# Patient Record
Sex: MALE | Race: WHITE | NOT HISPANIC OR LATINO | Employment: OTHER | URBAN - METROPOLITAN AREA
[De-identification: names, ages, dates, MRNs, and addresses within clinical notes are randomized per-mention and may not be internally consistent; named-entity substitution may affect disease eponyms.]

---

## 2019-03-30 ENCOUNTER — APPOINTMENT (OUTPATIENT)
Dept: RADIOLOGY | Facility: MEDICAL CENTER | Age: 69
DRG: 871 | End: 2019-03-30
Payer: MEDICARE

## 2019-03-30 ENCOUNTER — APPOINTMENT (OUTPATIENT)
Dept: RADIOLOGY | Facility: MEDICAL CENTER | Age: 69
DRG: 871 | End: 2019-03-30
Attending: EMERGENCY MEDICINE
Payer: MEDICARE

## 2019-03-30 ENCOUNTER — HOSPITAL ENCOUNTER (INPATIENT)
Facility: MEDICAL CENTER | Age: 69
LOS: 3 days | DRG: 871 | End: 2019-04-02
Attending: EMERGENCY MEDICINE | Admitting: INTERNAL MEDICINE
Payer: MEDICARE

## 2019-03-30 DIAGNOSIS — Z59.00 HOMELESSNESS: ICD-10-CM

## 2019-03-30 DIAGNOSIS — J18.9 COMMUNITY ACQUIRED PNEUMONIA OF RIGHT LUNG, UNSPECIFIED PART OF LUNG: ICD-10-CM

## 2019-03-30 DIAGNOSIS — R22.2 CHEST MASS: ICD-10-CM

## 2019-03-30 DIAGNOSIS — L03.115 CELLULITIS OF RIGHT LOWER EXTREMITY: ICD-10-CM

## 2019-03-30 DIAGNOSIS — N30.01 ACUTE CYSTITIS WITH HEMATURIA: ICD-10-CM

## 2019-03-30 DIAGNOSIS — R09.02 HYPOXIA: ICD-10-CM

## 2019-03-30 DIAGNOSIS — A41.9 SEPSIS, DUE TO UNSPECIFIED ORGANISM: ICD-10-CM

## 2019-03-30 PROBLEM — C80.1 MALIGNANCY (HCC): Status: ACTIVE | Noted: 2019-03-30

## 2019-03-30 PROBLEM — I10 HYPERTENSION: Status: ACTIVE | Noted: 2019-03-30

## 2019-03-30 PROBLEM — R50.9 FEVER: Status: ACTIVE | Noted: 2019-03-30

## 2019-03-30 PROBLEM — R65.10 SIRS (SYSTEMIC INFLAMMATORY RESPONSE SYNDROME) (HCC): Status: ACTIVE | Noted: 2019-03-30

## 2019-03-30 PROBLEM — L03.90 CELLULITIS: Status: ACTIVE | Noted: 2019-03-30

## 2019-03-30 PROBLEM — R06.82 TACHYPNEA: Status: ACTIVE | Noted: 2019-03-30

## 2019-03-30 PROBLEM — D72.825 BANDEMIA: Status: ACTIVE | Noted: 2019-03-30

## 2019-03-30 PROBLEM — R00.0 TACHYCARDIA: Status: ACTIVE | Noted: 2019-03-30

## 2019-03-30 PROBLEM — R91.8 LUNG MASS: Status: ACTIVE | Noted: 2019-03-30

## 2019-03-30 LAB
ALBUMIN SERPL BCP-MCNC: 3.8 G/DL (ref 3.2–4.9)
ALBUMIN/GLOB SERPL: 1.2 G/DL
ALP SERPL-CCNC: 80 U/L (ref 30–99)
ALT SERPL-CCNC: 11 U/L (ref 2–50)
AMPHET UR QL SCN: NEGATIVE
ANION GAP SERPL CALC-SCNC: 9 MMOL/L (ref 0–11.9)
APPEARANCE UR: CLEAR
AST SERPL-CCNC: 17 U/L (ref 12–45)
BACTERIA #/AREA URNS HPF: NEGATIVE /HPF
BARBITURATES UR QL SCN: NEGATIVE
BASOPHILS # BLD AUTO: 0.3 % (ref 0–1.8)
BASOPHILS # BLD: 0.06 K/UL (ref 0–0.12)
BENZODIAZ UR QL SCN: NEGATIVE
BILIRUB SERPL-MCNC: 1.5 MG/DL (ref 0.1–1.5)
BILIRUB UR QL STRIP.AUTO: NEGATIVE
BUN SERPL-MCNC: 10 MG/DL (ref 8–22)
BZE UR QL SCN: NEGATIVE
CALCIUM SERPL-MCNC: 8.7 MG/DL (ref 8.5–10.5)
CANNABINOIDS UR QL SCN: NEGATIVE
CHLORIDE SERPL-SCNC: 106 MMOL/L (ref 96–112)
CO2 SERPL-SCNC: 23 MMOL/L (ref 20–33)
COLOR UR: YELLOW
CREAT SERPL-MCNC: 0.73 MG/DL (ref 0.5–1.4)
EOSINOPHIL # BLD AUTO: 0 K/UL (ref 0–0.51)
EOSINOPHIL NFR BLD: 0 % (ref 0–6.9)
EPI CELLS #/AREA URNS HPF: NEGATIVE /HPF
ERYTHROCYTE [DISTWIDTH] IN BLOOD BY AUTOMATED COUNT: 48.6 FL (ref 35.9–50)
FLUAV RNA SPEC QL NAA+PROBE: NEGATIVE
FLUBV RNA SPEC QL NAA+PROBE: NEGATIVE
GLOBULIN SER CALC-MCNC: 3.1 G/DL (ref 1.9–3.5)
GLUCOSE SERPL-MCNC: 95 MG/DL (ref 65–99)
GLUCOSE UR STRIP.AUTO-MCNC: NEGATIVE MG/DL
HCT VFR BLD AUTO: 46.5 % (ref 42–52)
HGB BLD-MCNC: 15.2 G/DL (ref 14–18)
HYALINE CASTS #/AREA URNS LPF: ABNORMAL /LPF
IMM GRANULOCYTES # BLD AUTO: 0.15 K/UL (ref 0–0.11)
IMM GRANULOCYTES NFR BLD AUTO: 0.7 % (ref 0–0.9)
KETONES UR STRIP.AUTO-MCNC: NEGATIVE MG/DL
LACTATE BLD-SCNC: 1 MMOL/L (ref 0.5–2)
LACTATE BLD-SCNC: 1.8 MMOL/L (ref 0.5–2)
LEUKOCYTE ESTERASE UR QL STRIP.AUTO: NEGATIVE
LYMPHOCYTES # BLD AUTO: 0.47 K/UL (ref 1–4.8)
LYMPHOCYTES NFR BLD: 2.3 % (ref 22–41)
MCH RBC QN AUTO: 28.4 PG (ref 27–33)
MCHC RBC AUTO-ENTMCNC: 32.7 G/DL (ref 33.7–35.3)
MCV RBC AUTO: 86.8 FL (ref 81.4–97.8)
METHADONE UR QL SCN: NEGATIVE
MICRO URNS: ABNORMAL
MONOCYTES # BLD AUTO: 0.74 K/UL (ref 0–0.85)
MONOCYTES NFR BLD AUTO: 3.7 % (ref 0–13.4)
NEUTROPHILS # BLD AUTO: 18.64 K/UL (ref 1.82–7.42)
NEUTROPHILS NFR BLD: 93 % (ref 44–72)
NITRITE UR QL STRIP.AUTO: NEGATIVE
NRBC # BLD AUTO: 0 K/UL
NRBC BLD-RTO: 0 /100 WBC
OPIATES UR QL SCN: NEGATIVE
OXYCODONE UR QL SCN: NEGATIVE
PCP UR QL SCN: NEGATIVE
PH UR STRIP.AUTO: 7 [PH]
PHOSPHATE SERPL-MCNC: 3 MG/DL (ref 2.5–4.5)
PLATELET # BLD AUTO: 246 K/UL (ref 164–446)
PMV BLD AUTO: 9.3 FL (ref 9–12.9)
POTASSIUM SERPL-SCNC: 3.6 MMOL/L (ref 3.6–5.5)
PROCALCITONIN SERPL-MCNC: 0.91 NG/ML
PROPOXYPH UR QL SCN: NEGATIVE
PROT SERPL-MCNC: 6.9 G/DL (ref 6–8.2)
PROT UR QL STRIP: 30 MG/DL
RBC # BLD AUTO: 5.36 M/UL (ref 4.7–6.1)
RBC # URNS HPF: ABNORMAL /HPF
RBC UR QL AUTO: NEGATIVE
SODIUM SERPL-SCNC: 138 MMOL/L (ref 135–145)
SP GR UR STRIP.AUTO: 1.02
SPERM #/AREA URNS HPF: ABNORMAL /HPF
UROBILINOGEN UR STRIP.AUTO-MCNC: 1 MG/DL
WBC # BLD AUTO: 20.1 K/UL (ref 4.8–10.8)
WBC #/AREA URNS HPF: ABNORMAL /HPF

## 2019-03-30 PROCEDURE — 87502 INFLUENZA DNA AMP PROBE: CPT

## 2019-03-30 PROCEDURE — 700117 HCHG RX CONTRAST REV CODE 255: Performed by: EMERGENCY MEDICINE

## 2019-03-30 PROCEDURE — 93970 EXTREMITY STUDY: CPT

## 2019-03-30 PROCEDURE — 81001 URINALYSIS AUTO W/SCOPE: CPT

## 2019-03-30 PROCEDURE — 90670 PCV13 VACCINE IM: CPT | Performed by: INTERNAL MEDICINE

## 2019-03-30 PROCEDURE — 85025 COMPLETE CBC W/AUTO DIFF WBC: CPT

## 2019-03-30 PROCEDURE — 84145 PROCALCITONIN (PCT): CPT

## 2019-03-30 PROCEDURE — 71045 X-RAY EXAM CHEST 1 VIEW: CPT

## 2019-03-30 PROCEDURE — 71260 CT THORAX DX C+: CPT

## 2019-03-30 PROCEDURE — 96365 THER/PROPH/DIAG IV INF INIT: CPT

## 2019-03-30 PROCEDURE — 96375 TX/PRO/DX INJ NEW DRUG ADDON: CPT

## 2019-03-30 PROCEDURE — 700105 HCHG RX REV CODE 258: Performed by: EMERGENCY MEDICINE

## 2019-03-30 PROCEDURE — 87040 BLOOD CULTURE FOR BACTERIA: CPT | Mod: 91

## 2019-03-30 PROCEDURE — 83605 ASSAY OF LACTIC ACID: CPT

## 2019-03-30 PROCEDURE — 80053 COMPREHEN METABOLIC PANEL: CPT

## 2019-03-30 PROCEDURE — 90471 IMMUNIZATION ADMIN: CPT

## 2019-03-30 PROCEDURE — 70450 CT HEAD/BRAIN W/O DYE: CPT

## 2019-03-30 PROCEDURE — A9270 NON-COVERED ITEM OR SERVICE: HCPCS | Performed by: EMERGENCY MEDICINE

## 2019-03-30 PROCEDURE — 85610 PROTHROMBIN TIME: CPT

## 2019-03-30 PROCEDURE — 87086 URINE CULTURE/COLONY COUNT: CPT

## 2019-03-30 PROCEDURE — 99291 CRITICAL CARE FIRST HOUR: CPT

## 2019-03-30 PROCEDURE — 700111 HCHG RX REV CODE 636 W/ 250 OVERRIDE (IP): Performed by: EMERGENCY MEDICINE

## 2019-03-30 PROCEDURE — 700102 HCHG RX REV CODE 250 W/ 637 OVERRIDE(OP): Performed by: EMERGENCY MEDICINE

## 2019-03-30 PROCEDURE — 3E0234Z INTRODUCTION OF SERUM, TOXOID AND VACCINE INTO MUSCLE, PERCUTANEOUS APPROACH: ICD-10-PCS | Performed by: INTERNAL MEDICINE

## 2019-03-30 PROCEDURE — 99223 1ST HOSP IP/OBS HIGH 75: CPT | Performed by: INTERNAL MEDICINE

## 2019-03-30 PROCEDURE — 84100 ASSAY OF PHOSPHORUS: CPT

## 2019-03-30 PROCEDURE — 96367 TX/PROPH/DG ADDL SEQ IV INF: CPT

## 2019-03-30 PROCEDURE — 80307 DRUG TEST PRSMV CHEM ANLYZR: CPT

## 2019-03-30 PROCEDURE — 700111 HCHG RX REV CODE 636 W/ 250 OVERRIDE (IP): Performed by: STUDENT IN AN ORGANIZED HEALTH CARE EDUCATION/TRAINING PROGRAM

## 2019-03-30 PROCEDURE — 85730 THROMBOPLASTIN TIME PARTIAL: CPT

## 2019-03-30 PROCEDURE — 700111 HCHG RX REV CODE 636 W/ 250 OVERRIDE (IP): Performed by: INTERNAL MEDICINE

## 2019-03-30 PROCEDURE — 93005 ELECTROCARDIOGRAM TRACING: CPT | Performed by: STUDENT IN AN ORGANIZED HEALTH CARE EDUCATION/TRAINING PROGRAM

## 2019-03-30 PROCEDURE — 700105 HCHG RX REV CODE 258: Performed by: STUDENT IN AN ORGANIZED HEALTH CARE EDUCATION/TRAINING PROGRAM

## 2019-03-30 PROCEDURE — 770022 HCHG ROOM/CARE - ICU (200)

## 2019-03-30 RX ORDER — ACETAMINOPHEN 325 MG/1
650 TABLET ORAL EVERY 6 HOURS PRN
Status: DISCONTINUED | OUTPATIENT
Start: 2019-03-30 | End: 2019-04-02 | Stop reason: HOSPADM

## 2019-03-30 RX ORDER — SODIUM CHLORIDE, SODIUM LACTATE, POTASSIUM CHLORIDE, CALCIUM CHLORIDE 600; 310; 30; 20 MG/100ML; MG/100ML; MG/100ML; MG/100ML
INJECTION, SOLUTION INTRAVENOUS CONTINUOUS
Status: DISCONTINUED | OUTPATIENT
Start: 2019-03-30 | End: 2019-04-01

## 2019-03-30 RX ORDER — AMOXICILLIN 250 MG
2 CAPSULE ORAL 2 TIMES DAILY
Status: DISCONTINUED | OUTPATIENT
Start: 2019-03-30 | End: 2019-04-02 | Stop reason: HOSPADM

## 2019-03-30 RX ORDER — ONDANSETRON 4 MG/1
4 TABLET, ORALLY DISINTEGRATING ORAL EVERY 4 HOURS PRN
Status: DISCONTINUED | OUTPATIENT
Start: 2019-03-30 | End: 2019-04-02 | Stop reason: HOSPADM

## 2019-03-30 RX ORDER — ONDANSETRON 2 MG/ML
4 INJECTION INTRAMUSCULAR; INTRAVENOUS ONCE
Status: COMPLETED | OUTPATIENT
Start: 2019-03-30 | End: 2019-03-30

## 2019-03-30 RX ORDER — ACETAMINOPHEN 325 MG/1
650 TABLET ORAL ONCE
Status: COMPLETED | OUTPATIENT
Start: 2019-03-30 | End: 2019-03-30

## 2019-03-30 RX ORDER — SODIUM CHLORIDE 9 MG/ML
1000 INJECTION, SOLUTION INTRAVENOUS
Status: DISCONTINUED | OUTPATIENT
Start: 2019-03-30 | End: 2019-04-02 | Stop reason: HOSPADM

## 2019-03-30 RX ORDER — BISACODYL 10 MG
10 SUPPOSITORY, RECTAL RECTAL
Status: DISCONTINUED | OUTPATIENT
Start: 2019-03-30 | End: 2019-04-02 | Stop reason: HOSPADM

## 2019-03-30 RX ORDER — ONDANSETRON 2 MG/ML
4 INJECTION INTRAMUSCULAR; INTRAVENOUS EVERY 4 HOURS PRN
Status: DISCONTINUED | OUTPATIENT
Start: 2019-03-30 | End: 2019-04-02 | Stop reason: HOSPADM

## 2019-03-30 RX ORDER — POLYETHYLENE GLYCOL 3350 17 G/17G
1 POWDER, FOR SOLUTION ORAL
Status: DISCONTINUED | OUTPATIENT
Start: 2019-03-30 | End: 2019-04-02 | Stop reason: HOSPADM

## 2019-03-30 RX ORDER — HYDRALAZINE HYDROCHLORIDE 20 MG/ML
10 INJECTION INTRAMUSCULAR; INTRAVENOUS EVERY 4 HOURS PRN
Status: DISCONTINUED | OUTPATIENT
Start: 2019-03-30 | End: 2019-04-02 | Stop reason: HOSPADM

## 2019-03-30 RX ORDER — SODIUM CHLORIDE 9 MG/ML
2500 INJECTION, SOLUTION INTRAVENOUS ONCE
Status: COMPLETED | OUTPATIENT
Start: 2019-03-30 | End: 2019-03-31

## 2019-03-30 RX ADMIN — PIPERACILLIN AND TAZOBACTAM 4.5 G: 4; .5 INJECTION, POWDER, LYOPHILIZED, FOR SOLUTION INTRAVENOUS; PARENTERAL at 19:00

## 2019-03-30 RX ADMIN — PNEUMOCOCCAL 13-VALENT CONJUGATE VACCINE 0.5 ML: 2.2; 2.2; 2.2; 2.2; 2.2; 4.4; 2.2; 2.2; 2.2; 2.2; 2.2; 2.2; 2.2 INJECTION, SUSPENSION INTRAMUSCULAR at 22:05

## 2019-03-30 RX ADMIN — VANCOMYCIN HYDROCHLORIDE 2400 MG: 100 INJECTION, POWDER, LYOPHILIZED, FOR SOLUTION INTRAVENOUS at 19:49

## 2019-03-30 RX ADMIN — ACETAMINOPHEN 650 MG: 325 TABLET, FILM COATED ORAL at 19:00

## 2019-03-30 RX ADMIN — IOHEXOL 100 ML: 350 INJECTION, SOLUTION INTRAVENOUS at 19:32

## 2019-03-30 RX ADMIN — PIPERACILLIN AND TAZOBACTAM 4.5 G: 4; .5 INJECTION, POWDER, LYOPHILIZED, FOR SOLUTION INTRAVENOUS; PARENTERAL at 22:04

## 2019-03-30 RX ADMIN — SODIUM CHLORIDE, POTASSIUM CHLORIDE, SODIUM LACTATE AND CALCIUM CHLORIDE: 600; 310; 30; 20 INJECTION, SOLUTION INTRAVENOUS at 21:45

## 2019-03-30 RX ADMIN — ONDANSETRON 4 MG: 2 INJECTION INTRAMUSCULAR; INTRAVENOUS at 19:11

## 2019-03-30 RX ADMIN — SODIUM CHLORIDE 2500 ML: 9 INJECTION, SOLUTION INTRAVENOUS at 18:40

## 2019-03-30 SDOH — ECONOMIC STABILITY - HOUSING INSECURITY: HOMELESSNESS UNSPECIFIED: Z59.00

## 2019-03-30 ASSESSMENT — COGNITIVE AND FUNCTIONAL STATUS - GENERAL
DAILY ACTIVITIY SCORE: 23
CLIMB 3 TO 5 STEPS WITH RAILING: A LITTLE
MOBILITY SCORE: 23
SUGGESTED CMS G CODE MODIFIER MOBILITY: CI
SUGGESTED CMS G CODE MODIFIER DAILY ACTIVITY: CI
EATING MEALS: A LITTLE

## 2019-03-30 ASSESSMENT — LIFESTYLE VARIABLES
EVER FELT BAD OR GUILTY ABOUT YOUR DRINKING: NO
TOTAL SCORE: 0
HAVE YOU EVER FELT YOU SHOULD CUT DOWN ON YOUR DRINKING: NO
CONSUMPTION TOTAL: POSITIVE
EVER_SMOKED: YES
EVER HAD A DRINK FIRST THING IN THE MORNING TO STEADY YOUR NERVES TO GET RID OF A HANGOVER: NO
HOW MANY TIMES IN THE PAST YEAR HAVE YOU HAD 5 OR MORE DRINKS IN A DAY: 15
AVERAGE NUMBER OF DAYS PER WEEK YOU HAVE A DRINK CONTAINING ALCOHOL: 5
ALCOHOL_USE: YES
TOTAL SCORE: 0
ON A TYPICAL DAY WHEN YOU DRINK ALCOHOL HOW MANY DRINKS DO YOU HAVE: 2
TOTAL SCORE: 0
HAVE PEOPLE ANNOYED YOU BY CRITICIZING YOUR DRINKING: NO

## 2019-03-30 ASSESSMENT — PATIENT HEALTH QUESTIONNAIRE - PHQ9
1. LITTLE INTEREST OR PLEASURE IN DOING THINGS: SEVERAL DAYS
2. FEELING DOWN, DEPRESSED, IRRITABLE, OR HOPELESS: SEVERAL DAYS
8. MOVING OR SPEAKING SO SLOWLY THAT OTHER PEOPLE COULD HAVE NOTICED. OR THE OPPOSITE, BEING SO FIGETY OR RESTLESS THAT YOU HAVE BEEN MOVING AROUND A LOT MORE THAN USUAL: SEVERAL DAYS
SUM OF ALL RESPONSES TO PHQ QUESTIONS 1-9: 8
6. FEELING BAD ABOUT YOURSELF - OR THAT YOU ARE A FAILURE OR HAVE LET YOURSELF OR YOUR FAMILY DOWN: SEVERAL DAYS
7. TROUBLE CONCENTRATING ON THINGS, SUCH AS READING THE NEWSPAPER OR WATCHING TELEVISION: SEVERAL DAYS
5. POOR APPETITE OR OVEREATING: NOT AT ALL
4. FEELING TIRED OR HAVING LITTLE ENERGY: SEVERAL DAYS
9. THOUGHTS THAT YOU WOULD BE BETTER OFF DEAD, OR OF HURTING YOURSELF: SEVERAL DAYS
SUM OF ALL RESPONSES TO PHQ9 QUESTIONS 1 AND 2: 2
3. TROUBLE FALLING OR STAYING ASLEEP OR SLEEPING TOO MUCH: SEVERAL DAYS

## 2019-03-30 ASSESSMENT — COPD QUESTIONNAIRES
IN THE PAST 12 MONTHS DO YOU DO LESS THAN YOU USED TO BECAUSE OF YOUR BREATHING PROBLEMS: AGREE
COPD SCREENING SCORE: 7
DURING THE PAST 4 WEEKS HOW MUCH DID YOU FEEL SHORT OF BREATH: SOME OF THE TIME
DURING THE PAST 4 WEEKS HOW MUCH DID YOU FEEL SHORT OF BREATH: SOME OF THE TIME
HAVE YOU SMOKED AT LEAST 100 CIGARETTES IN YOUR ENTIRE LIFE: YES
HAVE YOU SMOKED AT LEAST 100 CIGARETTES IN YOUR ENTIRE LIFE: YES
COPD SCREENING SCORE: 7
DO YOU EVER COUGH UP ANY MUCUS OR PHLEGM?: YES, A FEW DAYS A WEEK OR MONTH
DO YOU EVER COUGH UP ANY MUCUS OR PHLEGM?: YES, A FEW DAYS A WEEK OR MONTH

## 2019-03-31 PROBLEM — Z59.00 HOMELESS: Status: ACTIVE | Noted: 2019-03-31

## 2019-03-31 PROBLEM — Z78.9 ALCOHOL USE: Status: ACTIVE | Noted: 2019-03-31

## 2019-03-31 PROBLEM — Z72.0 TOBACCO ABUSE: Status: ACTIVE | Noted: 2019-03-31

## 2019-03-31 PROBLEM — E87.6 HYPOKALEMIA: Status: ACTIVE | Noted: 2019-03-31

## 2019-03-31 PROBLEM — R41.82 ALTERED MENTAL STATUS: Status: ACTIVE | Noted: 2019-03-31

## 2019-03-31 PROBLEM — E83.42 HYPOMAGNESEMIA: Status: ACTIVE | Noted: 2019-03-31

## 2019-03-31 PROBLEM — R06.82 TACHYPNEA: Status: RESOLVED | Noted: 2019-03-30 | Resolved: 2019-03-31

## 2019-03-31 LAB
ALBUMIN SERPL BCP-MCNC: 3.2 G/DL (ref 3.2–4.9)
ALBUMIN/GLOB SERPL: 1.3 G/DL
ALP SERPL-CCNC: 54 U/L (ref 30–99)
ALT SERPL-CCNC: 9 U/L (ref 2–50)
AMMONIA PLAS-SCNC: 44 UMOL/L (ref 11–45)
ANION GAP SERPL CALC-SCNC: 6 MMOL/L (ref 0–11.9)
APTT PPP: 34.5 SEC (ref 24.7–36)
AST SERPL-CCNC: 14 U/L (ref 12–45)
BASOPHILS # BLD AUTO: 0.3 % (ref 0–1.8)
BASOPHILS # BLD: 0.06 K/UL (ref 0–0.12)
BILIRUB SERPL-MCNC: 1.8 MG/DL (ref 0.1–1.5)
BUN SERPL-MCNC: 12 MG/DL (ref 8–22)
CALCIUM SERPL-MCNC: 7.8 MG/DL (ref 8.5–10.5)
CHLORIDE SERPL-SCNC: 109 MMOL/L (ref 96–112)
CO2 SERPL-SCNC: 25 MMOL/L (ref 20–33)
CREAT SERPL-MCNC: 0.83 MG/DL (ref 0.5–1.4)
EKG IMPRESSION: NORMAL
EOSINOPHIL # BLD AUTO: 0 K/UL (ref 0–0.51)
EOSINOPHIL NFR BLD: 0 % (ref 0–6.9)
ERYTHROCYTE [DISTWIDTH] IN BLOOD BY AUTOMATED COUNT: 49.1 FL (ref 35.9–50)
ETHANOL BLD-MCNC: 0 G/DL
GLOBULIN SER CALC-MCNC: 2.5 G/DL (ref 1.9–3.5)
GLUCOSE SERPL-MCNC: 100 MG/DL (ref 65–99)
GRAM STN SPEC: NORMAL
GRAM STN SPEC: NORMAL
HCT VFR BLD AUTO: 43.6 % (ref 42–52)
HGB BLD-MCNC: 14.2 G/DL (ref 14–18)
HIV 1+2 AB+HIV1 P24 AG SERPL QL IA: NON REACTIVE
IMM GRANULOCYTES # BLD AUTO: 0.13 K/UL (ref 0–0.11)
IMM GRANULOCYTES NFR BLD AUTO: 0.6 % (ref 0–0.9)
INR PPP: 1.23 (ref 0.87–1.13)
LACTATE BLD-SCNC: 1.1 MMOL/L (ref 0.5–2)
LYMPHOCYTES # BLD AUTO: 0.76 K/UL (ref 1–4.8)
LYMPHOCYTES NFR BLD: 3.7 % (ref 22–41)
MAGNESIUM SERPL-MCNC: 1.5 MG/DL (ref 1.5–2.5)
MCH RBC QN AUTO: 28.3 PG (ref 27–33)
MCHC RBC AUTO-ENTMCNC: 32.6 G/DL (ref 33.7–35.3)
MCV RBC AUTO: 86.9 FL (ref 81.4–97.8)
MONOCYTES # BLD AUTO: 0.64 K/UL (ref 0–0.85)
MONOCYTES NFR BLD AUTO: 3.1 % (ref 0–13.4)
MRSA DNA SPEC QL NAA+PROBE: NORMAL
NEUTROPHILS # BLD AUTO: 18.85 K/UL (ref 1.82–7.42)
NEUTROPHILS NFR BLD: 92.3 % (ref 44–72)
NRBC # BLD AUTO: 0 K/UL
NRBC BLD-RTO: 0 /100 WBC
PLATELET # BLD AUTO: 215 K/UL (ref 164–446)
PMV BLD AUTO: 9.3 FL (ref 9–12.9)
POTASSIUM SERPL-SCNC: 3.7 MMOL/L (ref 3.6–5.5)
PROT SERPL-MCNC: 5.7 G/DL (ref 6–8.2)
PROTHROMBIN TIME: 15.6 SEC (ref 12–14.6)
RBC # BLD AUTO: 5.02 M/UL (ref 4.7–6.1)
SIGNIFICANT IND 70042: NORMAL
SITE SITE: NORMAL
SODIUM SERPL-SCNC: 140 MMOL/L (ref 135–145)
SOURCE SOURCE: NORMAL
TROPONIN I SERPL-MCNC: 0.04 NG/ML (ref 0–0.04)
TSH SERPL DL<=0.005 MIU/L-ACNC: 0.38 UIU/ML (ref 0.38–5.33)
VIT B12 SERPL-MCNC: 251 PG/ML (ref 211–911)
WBC # BLD AUTO: 20.4 K/UL (ref 4.8–10.8)

## 2019-03-31 PROCEDURE — 3E02340 INTRODUCTION OF INFLUENZA VACCINE INTO MUSCLE, PERCUTANEOUS APPROACH: ICD-10-PCS | Performed by: INTERNAL MEDICINE

## 2019-03-31 PROCEDURE — 87077 CULTURE AEROBIC IDENTIFY: CPT | Mod: 91

## 2019-03-31 PROCEDURE — 90662 IIV NO PRSV INCREASED AG IM: CPT | Performed by: INTERNAL MEDICINE

## 2019-03-31 PROCEDURE — 700102 HCHG RX REV CODE 250 W/ 637 OVERRIDE(OP): Performed by: STUDENT IN AN ORGANIZED HEALTH CARE EDUCATION/TRAINING PROGRAM

## 2019-03-31 PROCEDURE — 87186 SC STD MICRODIL/AGAR DIL: CPT

## 2019-03-31 PROCEDURE — 87641 MR-STAPH DNA AMP PROBE: CPT

## 2019-03-31 PROCEDURE — 87205 SMEAR GRAM STAIN: CPT

## 2019-03-31 PROCEDURE — 93010 ELECTROCARDIOGRAM REPORT: CPT | Performed by: INTERNAL MEDICINE

## 2019-03-31 PROCEDURE — 82140 ASSAY OF AMMONIA: CPT

## 2019-03-31 PROCEDURE — G0475 HIV COMBINATION ASSAY: HCPCS

## 2019-03-31 PROCEDURE — 84484 ASSAY OF TROPONIN QUANT: CPT

## 2019-03-31 PROCEDURE — A9270 NON-COVERED ITEM OR SERVICE: HCPCS | Performed by: STUDENT IN AN ORGANIZED HEALTH CARE EDUCATION/TRAINING PROGRAM

## 2019-03-31 PROCEDURE — 700105 HCHG RX REV CODE 258: Performed by: STUDENT IN AN ORGANIZED HEALTH CARE EDUCATION/TRAINING PROGRAM

## 2019-03-31 PROCEDURE — 770006 HCHG ROOM/CARE - MED/SURG/GYN SEMI*

## 2019-03-31 PROCEDURE — 82607 VITAMIN B-12: CPT

## 2019-03-31 PROCEDURE — 84443 ASSAY THYROID STIM HORMONE: CPT

## 2019-03-31 PROCEDURE — 700111 HCHG RX REV CODE 636 W/ 250 OVERRIDE (IP): Performed by: STUDENT IN AN ORGANIZED HEALTH CARE EDUCATION/TRAINING PROGRAM

## 2019-03-31 PROCEDURE — 99233 SBSQ HOSP IP/OBS HIGH 50: CPT | Performed by: INTERNAL MEDICINE

## 2019-03-31 PROCEDURE — 90471 IMMUNIZATION ADMIN: CPT

## 2019-03-31 PROCEDURE — 700111 HCHG RX REV CODE 636 W/ 250 OVERRIDE (IP): Performed by: INTERNAL MEDICINE

## 2019-03-31 PROCEDURE — 83605 ASSAY OF LACTIC ACID: CPT

## 2019-03-31 PROCEDURE — 87070 CULTURE OTHR SPECIMN AEROBIC: CPT

## 2019-03-31 PROCEDURE — 83735 ASSAY OF MAGNESIUM: CPT

## 2019-03-31 PROCEDURE — 85025 COMPLETE CBC W/AUTO DIFF WBC: CPT

## 2019-03-31 PROCEDURE — 80307 DRUG TEST PRSMV CHEM ANLYZR: CPT

## 2019-03-31 PROCEDURE — 80053 COMPREHEN METABOLIC PANEL: CPT

## 2019-03-31 PROCEDURE — 92610 EVALUATE SWALLOWING FUNCTION: CPT

## 2019-03-31 PROCEDURE — 700101 HCHG RX REV CODE 250: Performed by: INTERNAL MEDICINE

## 2019-03-31 PROCEDURE — 700105 HCHG RX REV CODE 258: Performed by: INTERNAL MEDICINE

## 2019-03-31 PROCEDURE — 36415 COLL VENOUS BLD VENIPUNCTURE: CPT

## 2019-03-31 RX ORDER — NICOTINE 21 MG/24HR
14 PATCH, TRANSDERMAL 24 HOURS TRANSDERMAL
Status: DISCONTINUED | OUTPATIENT
Start: 2019-03-31 | End: 2019-04-02

## 2019-03-31 RX ORDER — SODIUM CHLORIDE 9 MG/ML
INJECTION, SOLUTION INTRAVENOUS
Status: ACTIVE
Start: 2019-03-31 | End: 2019-03-31

## 2019-03-31 RX ORDER — FOLIC ACID 1 MG/1
1 TABLET ORAL DAILY
Status: DISCONTINUED | OUTPATIENT
Start: 2019-04-01 | End: 2019-04-02 | Stop reason: HOSPADM

## 2019-03-31 RX ORDER — THIAMINE MONONITRATE (VIT B1) 100 MG
100 TABLET ORAL DAILY
Status: DISCONTINUED | OUTPATIENT
Start: 2019-04-01 | End: 2019-04-02 | Stop reason: HOSPADM

## 2019-03-31 RX ORDER — POTASSIUM CHLORIDE 7.45 MG/ML
10 INJECTION INTRAVENOUS
Status: COMPLETED | OUTPATIENT
Start: 2019-03-31 | End: 2019-03-31

## 2019-03-31 RX ORDER — MAGNESIUM SULFATE HEPTAHYDRATE 40 MG/ML
4 INJECTION, SOLUTION INTRAVENOUS ONCE
Status: COMPLETED | OUTPATIENT
Start: 2019-03-31 | End: 2019-03-31

## 2019-03-31 RX ADMIN — NICOTINE 14 MG: 14 PATCH, EXTENDED RELEASE TRANSDERMAL at 10:20

## 2019-03-31 RX ADMIN — ACETAMINOPHEN 650 MG: 325 TABLET, FILM COATED ORAL at 19:44

## 2019-03-31 RX ADMIN — MAGNESIUM SULFATE IN WATER 4 G: 40 INJECTION, SOLUTION INTRAVENOUS at 06:17

## 2019-03-31 RX ADMIN — SODIUM CHLORIDE, POTASSIUM CHLORIDE, SODIUM LACTATE AND CALCIUM CHLORIDE: 600; 310; 30; 20 INJECTION, SOLUTION INTRAVENOUS at 18:04

## 2019-03-31 RX ADMIN — AMPICILLIN SODIUM AND SULBACTAM SODIUM 3 G: 2; 1 INJECTION, POWDER, FOR SOLUTION INTRAMUSCULAR; INTRAVENOUS at 12:26

## 2019-03-31 RX ADMIN — INFLUENZA A VIRUS A/MICHIGAN/45/2015 X-275 (H1N1) ANTIGEN (FORMALDEHYDE INACTIVATED), INFLUENZA A VIRUS A/SINGAPORE/INFIMH-16-0019/2016 IVR-186 (H3N2) ANTIGEN (FORMALDEHYDE INACTIVATED), AND INFLUENZA B VIRUS B/MARYLAND/15/2016 BX-69A (A B/COLORADO/6/2017-LIKE VIRUS) ANTIGEN (FORMALDEHYDE INACTIVATED) 0.5 ML: 60; 60; 60 INJECTION, SUSPENSION INTRAMUSCULAR at 12:27

## 2019-03-31 RX ADMIN — POTASSIUM CHLORIDE 10 MEQ: 7.46 INJECTION, SOLUTION INTRAVENOUS at 07:17

## 2019-03-31 RX ADMIN — AMPICILLIN SODIUM AND SULBACTAM SODIUM 3 G: 2; 1 INJECTION, POWDER, FOR SOLUTION INTRAMUSCULAR; INTRAVENOUS at 23:58

## 2019-03-31 RX ADMIN — ENOXAPARIN SODIUM 40 MG: 100 INJECTION SUBCUTANEOUS at 05:55

## 2019-03-31 RX ADMIN — PIPERACILLIN AND TAZOBACTAM 4.5 G: 4; .5 INJECTION, POWDER, LYOPHILIZED, FOR SOLUTION INTRAVENOUS; PARENTERAL at 07:17

## 2019-03-31 RX ADMIN — VANCOMYCIN HYDROCHLORIDE 1300 MG: 100 INJECTION, POWDER, LYOPHILIZED, FOR SOLUTION INTRAVENOUS at 21:52

## 2019-03-31 RX ADMIN — CALCIUM GLUCONATE 1 G: 98 INJECTION, SOLUTION INTRAVENOUS at 05:55

## 2019-03-31 RX ADMIN — FOLIC ACID: 5 INJECTION, SOLUTION INTRAMUSCULAR; INTRAVENOUS; SUBCUTANEOUS at 19:44

## 2019-03-31 RX ADMIN — SODIUM CHLORIDE, POTASSIUM CHLORIDE, SODIUM LACTATE AND CALCIUM CHLORIDE: 600; 310; 30; 20 INJECTION, SOLUTION INTRAVENOUS at 06:37

## 2019-03-31 RX ADMIN — AMPICILLIN SODIUM AND SULBACTAM SODIUM 3 G: 2; 1 INJECTION, POWDER, FOR SOLUTION INTRAMUSCULAR; INTRAVENOUS at 18:03

## 2019-03-31 RX ADMIN — POTASSIUM CHLORIDE 10 MEQ: 7.46 INJECTION, SOLUTION INTRAVENOUS at 05:56

## 2019-03-31 RX ADMIN — VANCOMYCIN HYDROCHLORIDE 1300 MG: 100 INJECTION, POWDER, LYOPHILIZED, FOR SOLUTION INTRAVENOUS at 10:20

## 2019-03-31 ASSESSMENT — ENCOUNTER SYMPTOMS
BLURRED VISION: 0
ABDOMINAL PAIN: 0
SINUS PAIN: 0
EYES NEGATIVE: 1
ORTHOPNEA: 0
DIARRHEA: 0
HALLUCINATIONS: 0
SPUTUM PRODUCTION: 1
BACK PAIN: 0
SPUTUM PRODUCTION: 0
NAUSEA: 0
DIZZINESS: 0
CHILLS: 1
HEMOPTYSIS: 0
SHORTNESS OF BREATH: 0
PALPITATIONS: 0
MUSCULOSKELETAL NEGATIVE: 1
CARDIOVASCULAR NEGATIVE: 1
NEUROLOGICAL NEGATIVE: 1
WHEEZING: 0
HEARTBURN: 0
SORE THROAT: 0
FEVER: 1
BRUISES/BLEEDS EASILY: 0
MYALGIAS: 0
WEIGHT LOSS: 0
DEPRESSION: 0
COUGH: 1
VOMITING: 0
CHILLS: 0
GASTROINTESTINAL NEGATIVE: 1
FEVER: 0
HEADACHES: 0

## 2019-03-31 NOTE — ASSESSMENT & PLAN NOTE
Improving, possibly close to baseline  History of schizophrenia  Further chart review and patient investigation to evaluate whether he takes any medications  Limit sedatives

## 2019-03-31 NOTE — PROGRESS NOTES
"Pharmacy Kinetics 68 y.o. male on vancomycin day # 1 3/30/2019    Received vancomycin loading dose    Indication for Treatment: sepsis, unknown source (likely cellulitis/pneumonia)    Pertinent history per medical record: Admitted on 3/30/2019. Brought in by ambulance presenting with fever. Patient is believed to be homeless. Altered, no concerns for meningitis. Lung mass on CT scan. Appears to have a postobstructive pneumonia secondary to the right lung mass. 2 lower extremity wounds. Transferred to ICU.    Other antibiotics: Zosyn    Allergies: Tape     List concerns for renal function: age, recent IV contrast    Pertinent cultures to date:   3/30/18 Blood cultures x2 - in process  3/30/18 wound cultures - needs to be collected    PCT 0.91, MRSA nares ordered    Recent Labs      19   1850   WBC  20.1*   NEUTSPOLYS  93.00*     Recent Labs      19   1745   BUN  10   CREATININE  0.73   ALBUMIN  3.8     No results for input(s): VANCOTROUGH, VANCOPEAK, VANCORANDOM in the last 72 hours.  Intake/Output Summary (Last 24 hours) at 19 2323  Last data filed at 19 2100   Gross per 24 hour   Intake             1200 ml   Output                0 ml   Net             1200 ml      Blood pressure (!) 176/114, pulse (!) 117, temperature 36.6 °C (97.8 °F), temperature source Temporal, resp. rate (!) 22, height 1.829 m (6' 0.01\"), weight 89 kg (196 lb 3.4 oz), SpO2 96 %. Temp (24hrs), Av.1 °C (100.6 °F), Min:36.6 °C (97.8 °F), Max:39.6 °C (103.3 °F)      A/P   1. Vancomycin dose change: Start vancomycin 15mg/kg q12hr (1300mg)  2. Next vancomycin level: Prior to the 3rd or 4th dose (not ordered)  3. Goal trough: 16-20 mcg/ml - target lower end of goal   4. Comments: Dosing as above. Appears low risk for vancomycin accumulation. Did get IV contrast. Monitor renal. Pharmacy will follow. Narrow therapy as able.    Crow Goss, AlexiaD, BCPS    "

## 2019-03-31 NOTE — ASSESSMENT & PLAN NOTE
Presumed to be lung mass on imaging  Evaluate [facility] medical [facility] as at [facility] to further investigate past workup  May need endobronchial biopsy if has not had it performed in the past

## 2019-03-31 NOTE — ED PROVIDER NOTES
ED Provider Note    Scribed for Freedom Ho M.D. by Daniella Jacob. 3/30/2019  6:33 PM    Means of arrival: Ambulance  History obtained from: Patient  History limited by: altered mental status     CHIEF COMPLAINT  Chief Complaint   Patient presents with   • Fever     BIB [facility].  Bystanders saw pt behind [employer] in [place].  Concern for ETOH intoxication but pt's breathalizer was 0.0.  Pt just mumbling, unable to walk.  Very unkempt and dirty.  Believed to be homeless.     HPI  Nestor Strong is a 68 y.o. male brought in by ambulance presenting with fever. Per nursing note, bystanders saw the patient sitting behind the [employer] in [place]. There was concern for alcohol intoxication but patient's breathalizer was 0.0. Patient is believed to be homeless.     Further HPI unobtainable secondary to altered mental status. Patient was mumbling and unable to answer any of my questions.     REVIEW OF SYSTEMS  See HPI for further details.   Pertinent positives include: fever, emesis    Further ROS unobtainable secondary to altered mental status    PAST MEDICAL HISTORY   has a past medical history of Diabetes; Homeless; and Schizophrenia.    SOCIAL HISTORY  Social History     Social History Main Topics   • Smoking status: Current Every Day Smoker   • Alcohol use Yes      Comment: occ   • Drug use: No       SURGICAL HISTORY   has a past surgical history that includes hernia repair and mass excision general (10/1/2010).    CURRENT MEDICATIONS    Current Facility-Administered Medications:   •  vancomycin 2,400 mg in  mL IVPB, 25 mg/kg, Intravenous, Once, Freedom Ho M.D., Last Rate: 166.7 mL/hr at 03/30/19 1949, 2,400 mg at 03/30/19 1949  •  senna-docusate (PERICOLACE or SENOKOT S) 8.6-50 MG per tablet 2 Tab, 2 Tab, Oral, BID **AND** polyethylene glycol/lytes (MIRALAX) PACKET 1 Packet, 1 Packet, Oral, QDAY PRN **AND** magnesium hydroxide (MILK OF MAGNESIA) suspension 30 mL, 30 mL, Oral, QDAY  PRN **AND** bisacodyl (DULCOLAX) suppository 10 mg, 10 mg, Rectal, QDAY PRN, Tank Schmitz M.D.  •  Respiratory Care per Protocol, , Nebulization, Continuous RT, Tank Schmitz M.D.  •  NS (BOLUS) infusion 1,000 mL, 1,000 mL, Intravenous, Once PRN, Tank Schmitz M.D.  •  lactated ringers infusion, , Intravenous, Continuous, Tank Schmitz M.D.  •  [START ON 3/31/2019] enoxaparin (LOVENOX) inj 40 mg, 40 mg, Subcutaneous, DAILY, Tank Schmitz M.D.  •  hydrALAZINE (APRESOLINE) injection 10 mg, 10 mg, Intravenous, Q4HRS PRN, Tank Schmitz M.D.  •  ondansetron (ZOFRAN) syringe/vial injection 4 mg, 4 mg, Intravenous, Q4HRS PRN, Tank Schmitz M.D.  •  ondansetron (ZOFRAN ODT) dispertab 4 mg, 4 mg, Oral, Q4HRS PRN, Tank Schmitz M.D.  •  acetaminophen (TYLENOL) tablet 650 mg, 650 mg, Oral, Q6HRS PRN, Tank Schmitz M.D.  •  [DISCONTINUED] piperacillin-tazobactam (ZOSYN) 4.5 g in  mL IVPB, 4.5 g, Intravenous, Once **AND** piperacillin-tazobactam (ZOSYN) 4.5 g in  mL IVPB, 4.5 g, Intravenous, Q8HRS, Tank Schmitz M.D.  •  MD Alert...Vancomycin per Pharmacy, , Other, PHARMACY TO DOSE, Tank Schmitz M.D.  No current outpatient prescriptions on file.    ALLERGIES  Allergies   Allergen Reactions   • Tape        PHYSICAL EXAM  VITAL SIGNS: BP (!) 176/114   Pulse (!) 104   Temp (!) 39.6 °C (103.3 °F) (Oral)   Resp (!) 34      SpO2: I interpret this pulse oximetry as normal  Constitutional: Dirty, disheveled.  Drowsy but arousable  HENT: Normocephalic, Atraumatic, Bilateral external ears normal, Oropharynx moist, No oral exudates.   Eyes: PERRLA, EOMI, Conjunctiva normal, No discharge.   Neck: No tenderness, Supple, No stridor.   Lymphatic: No lymphadenopathy noted.   Cardiovascular: Tachcyardic, Normal rhythm.   Thorax & Lungs: Shallow breath sounds, but clear to auscultation bilaterally, No respiratory distress, No wheezing, No crackles.   Abdomen: Distended. Soft, No  tenderness, No masses, No pulsatile masses. Non peritoneal   Skin: Midline healed scar to abdomen. Warm, Dry, No erythema, No rash.   Extremities: Pitting edema to right lower extremity with redness and warmth that extends to the knee. Slight left sided pitting edema. No cyanosis.   Musculoskeletal: No tenderness to palpation or major deformities noted.  Intact distal pulses  Neurologic: Moves all extremities spontaneously.  No focal neuro deficit, strength and sensation appear to be intact.  No obvious cranial nerve deficit.  Psychiatric: Mumbling, unable to answer any of my questions. Confused but arousable.       DIAGNOSTIC STUDIES / PROCEDURES    EKG  As below    LABORATORY  Results for orders placed or performed during the hospital encounter of 03/30/19   Lactic acid (lactate)   Result Value Ref Range    Lactic Acid 1.8 0.5 - 2.0 mmol/L   COMP METABOLIC PANEL   Result Value Ref Range    Sodium 138 135 - 145 mmol/L    Potassium 3.6 3.6 - 5.5 mmol/L    Chloride 106 96 - 112 mmol/L    Co2 23 20 - 33 mmol/L    Anion Gap 9.0 0.0 - 11.9    Glucose 95 65 - 99 mg/dL    Bun 10 8 - 22 mg/dL    Creatinine 0.73 0.50 - 1.40 mg/dL    Calcium 8.7 8.5 - 10.5 mg/dL    AST(SGOT) 17 12 - 45 U/L    ALT(SGPT) 11 2 - 50 U/L    Alkaline Phosphatase 80 30 - 99 U/L    Total Bilirubin 1.5 0.1 - 1.5 mg/dL    Albumin 3.8 3.2 - 4.9 g/dL    Total Protein 6.9 6.0 - 8.2 g/dL    Globulin 3.1 1.9 - 3.5 g/dL    A-G Ratio 1.2 g/dL   URINALYSIS   Result Value Ref Range    Color Yellow     Character Clear     Specific Gravity 1.017 <1.035    Ph 7.0 5.0 - 8.0    Glucose Negative Negative mg/dL    Ketones Negative Negative mg/dL    Protein 30 (A) Negative mg/dL    Bilirubin Negative Negative    Urobilinogen, Urine 1.0 Negative    Nitrite Negative Negative    Leukocyte Esterase Negative Negative    Occult Blood Negative Negative    Micro Urine Req Microscopic    URINE MICROSCOPIC (W/UA)   Result Value Ref Range    WBC 10-20 (A) /hpf    RBC 2-5 (A)  /hpf    Bacteria Negative None /hpf    Epithelial Cells Negative /hpf    Hyaline Cast 0-2 /lpf    Sperm Moderate /hpf   ESTIMATED GFR   Result Value Ref Range    GFR If African American >60 >60 mL/min/1.73 m 2    GFR If Non African American >60 >60 mL/min/1.73 m 2   CBC WITH DIFFERENTIAL   Result Value Ref Range    WBC 20.1 (H) 4.8 - 10.8 K/uL    RBC 5.36 4.70 - 6.10 M/uL    Hemoglobin 15.2 14.0 - 18.0 g/dL    Hematocrit 46.5 42.0 - 52.0 %    MCV 86.8 81.4 - 97.8 fL    MCH 28.4 27.0 - 33.0 pg    MCHC 32.7 (L) 33.7 - 35.3 g/dL    RDW 48.6 35.9 - 50.0 fL    Platelet Count 246 164 - 446 K/uL    MPV 9.3 9.0 - 12.9 fL    Neutrophils-Polys 93.00 (H) 44.00 - 72.00 %    Lymphocytes 2.30 (L) 22.00 - 41.00 %    Monocytes 3.70 0.00 - 13.40 %    Eosinophils 0.00 0.00 - 6.90 %    Basophils 0.30 0.00 - 1.80 %    Immature Granulocytes 0.70 0.00 - 0.90 %    Nucleated RBC 0.00 /100 WBC    Neutrophils (Absolute) 18.64 (H) 1.82 - 7.42 K/uL    Lymphs (Absolute) 0.47 (L) 1.00 - 4.80 K/uL    Monos (Absolute) 0.74 0.00 - 0.85 K/uL    Eos (Absolute) 0.00 0.00 - 0.51 K/uL    Baso (Absolute) 0.06 0.00 - 0.12 K/uL    Immature Granulocytes (abs) 0.15 (H) 0.00 - 0.11 K/uL    NRBC (Absolute) 0.00 K/uL   URINE DRUG SCREEN   Result Value Ref Range    Amphetamines Urine Negative Negative    Barbiturates Negative Negative    Benzodiazepines Negative Negative    Cocaine Metabolite Negative Negative    Methadone Negative Negative    Opiates Negative Negative    Oxycodone Negative Negative    Phencyclidine -Pcp Negative Negative    Propoxyphene Negative Negative    Cannabinoid Metab Negative Negative   Lactic Acid -STAT Once   Result Value Ref Range    Lactic Acid 1.0 0.5 - 2.0 mmol/L     All labs reviewed by me.    RADIOLOGY  US-EXTREMITY VENOUS LOWER BILAT   Final Result      CT-CHEST,ABDOMEN,PELVIS WITH   Final Result      1.  Possible mass versus pericardial cyst noted in the right lower chest adjacent to the right heart border in the infrahilar  area measuring 3.5 cm in diameter. Lung carcinoma is in the differential diagnosis.      2.  Additional area of consolidation medially in the right middle lobe is located more anteriorly in the right hemithorax. Pneumonia is a possibility.      3.  Mild mediastinal and hilar adenopathy is noted.      4.  Mild cardiomegaly.      5.  Post cholecystectomy.      6.  Small renal cyst is noted in each kidney.      7.  No acute abnormalities noted in the abdomen or pelvis.         CT-HEAD W/O   Final Result         NO ACUTE ABNORMALITIES ARE NOTED ON CT SCAN OF THE HEAD.         DX-CHEST-PORTABLE (1 VIEW)   Final Result         1.  Wedge-shaped focal opacification possibly representing consolidation is identified along the right heart border likely in the right middle pulmonary lobe. Pneumonia is a possibility. Consolidative atelectasis is also possible. Underlying carcinoma is    in the differential diagnosis.        The radiologist's interpretations of all radiological studies have been reviewed by me.        Chest XR, 1 view (my read): Right middle lobe consolidation, no significant cardiomegaly or pulmonary edema.  Normal mediastinum. No prior films were immediately available for comparison.  Impression: Pneumonia    CHART REVIEW  Pertinent medical chart information was reviewed and reveals: no recent pertinent encounters    COURSE & MEDICAL DECISION MAKING  Pertinent Labs & Imaging studies reviewed. (See chart for details)    Differential diagnoses include but not limited to: Alcohol intoxication, alcohol withdrawal, electrolyte abnormality, sepsis, pneumonia, UTI, intra-abdominal infection, metabolic encephalopathy    6:33 PM - Patient seen and examined at bedside. The patient will be resuscitated with 1L NS IV and medicated with Tylenol 650 mg, Zofran 4 mg, Zosyn 4.5 g. Ordered for DX-chest, lactic acid, influenza A/B, estimated GFR, urine culture, urinalysis, urine microscope, CBC with differential, CMP, blood  culture, and EKG to evaluate his symptoms.     7:03 PM - Re-evaluated the patient. He informs me that he is just feeling very cold right now. Reports no allergies to medications. Informed him that I am concerned for pneumonia and plan is to admit.     7:41 PM - Reassessed the patient who denies any alcohol use and denies being in alcohol withdrawal today.     8:39 PM - Evaluated patient who is being prepped for transport to ICU. He appears stable and improved.     CRITICAL CARE  The very real possibility of a deterioration of this patient's condition required the highest level of my preparedness for sudden, emergent intervention. I provided critical care services, which included medication orders, frequent reevaluations of the patient's condition and response to treatment, ordering and reviewing test results, and discussing the case with various consultants. The critical care time associated with the care of the patient was 30 minutes. Review chart for interventions. This time is exclusive of any other billable procedures.     Vitals:    03/30/19 1830 03/30/19 1853 03/30/19 1930 03/30/19 2030   BP:       Pulse:   (!) 127 (!) 117   Resp: (!) 23  (!) 22 (!) 22   Temp:       TempSrc:       SpO2:   90% 96%   Weight:  95 kg (209 lb 7 oz)     Height:  1.829 m (6')         Medications   vancomycin 2,400 mg in  mL IVPB (2,400 mg Intravenous New Bag 3/30/19 1949)   senna-docusate (PERICOLACE or SENOKOT S) 8.6-50 MG per tablet 2 Tab (not administered)     And   polyethylene glycol/lytes (MIRALAX) PACKET 1 Packet (not administered)     And   magnesium hydroxide (MILK OF MAGNESIA) suspension 30 mL (not administered)     And   bisacodyl (DULCOLAX) suppository 10 mg (not administered)   Respiratory Care per Protocol (not administered)   NS (BOLUS) infusion 1,000 mL (not administered)   lactated ringers infusion (not administered)   enoxaparin (LOVENOX) inj 40 mg (not administered)   hydrALAZINE (APRESOLINE) injection 10 mg  (not administered)   ondansetron (ZOFRAN) syringe/vial injection 4 mg (not administered)   ondansetron (ZOFRAN ODT) dispertab 4 mg (not administered)   acetaminophen (TYLENOL) tablet 650 mg (not administered)   piperacillin-tazobactam (ZOSYN) 4.5 g in  mL IVPB (not administered)   MD Alert...Vancomycin per Pharmacy (not administered)   acetaminophen (TYLENOL) tablet 650 mg (650 mg Oral Given 3/30/19 1900)   ondansetron (ZOFRAN) syringe/vial injection 4 mg (4 mg Intravenous Given 3/30/19 1911)   piperacillin-tazobactam (ZOSYN) 4.5 g in  mL IVPB (0 g Intravenous Stopped 3/30/19 1930)   NS infusion 2,500 mL (2,500 mL Intravenous New Bag 3/30/19 1840)   iohexol (OMNIPAQUE) 350 mg/mL (100 mL Intravenous Given 3/30/19 1932)       HYDRATION: Based on the patient's presentation of Sepsis the patient was given IV fluids. IV Hydration was used because oral hydration was not as rapid as required. Upon recheck following hydration, the patient was improved.    68-year-old homeless male with history of schizophrenia presenting for altered mental status.  Found mumbling, laying down, thought to be drunk.  However breathalyzer was negative.  Was drowsy and mumbling on my evaluation, tachycardic and febrile, signs of cellulitis in the right lower extremity with coarse breath sounds throughout, distended abdomen.  No signs of acute surgical abdomen however concern for multiple sources of infection.  Extensive workup initiated, given empiric fluids and antibiotics.   Labs show leukocytosis and normal lactate.Imaging shows signs of pneumonia and also lung mass, possibly postobstructive pneumonia.  Urine also with some signs of infection.   Patient began to mentate more clearly and able to answer questions appropriately, denies any alcohol use, does not provide any history concerning for alcohol withdrawal.  Patient developed slight hypoxia during fluid bolus, responded supplemental oxygen.  Patient currently with sepsis  from multiple sources with likely lung malignancy, expect he will deteriorate with continued fluid resuscitation and antibiotic administration.  Consulted [facility], ICU, for admission.  Currently maintaining airway and no hypotension at time of admission however very tenuous and critically ill.    DISPOSITION  DISPOSITION:  Patient will be admitted to [facility] medicine, ICU, in critical condition.      FINAL IMPRESSION  Performed 30 minutes of Critical Care Time  Visit Diagnoses     ICD-10-CM   1. Acute cystitis with hematuria N30.01   2. Sepsis, due to unspecified organism (HCC) A41.9   3. Community acquired pneumonia of right lung, unspecified part of lung J18.9   4. Cellulitis of right lower extremity L03.115   5. Chest mass R22.2   6. Homelessness Z59.0   7. Hypoxia R09.02        Daniella DALLAS (Scribe), am scribing for, and in the presence of, Freedom Ho M.D..    Electronically signed by: Daniella Jacob (Scribe), 3/30/2019    Freedom DALLAS M.D. personally performed the services described in this documentation, as scribed by Daniella Jacob in my presence, and it is both accurate and complete. C    The note accurately reflects work and decisions made by me.  Freedom Ho  3/30/2019  8:56 PM

## 2019-03-31 NOTE — ASSESSMENT & PLAN NOTE
Patient appears to have a postobstructive pneumonia secondary to the right lung mass noted on CT  Procal 0.91  Blood cultures and sputum cultures pending  Discontinued vancomycin, currently only on Unasyn

## 2019-03-31 NOTE — PROGRESS NOTES
[facility] Progress Note    Admit Date: 3/30/2019  Resident(s): RIC PARKER  Date & Time:   3/31/2019   2:01 PM       HPI:  Mr Strong is a 68-year-old male who was brought in by ambulance.  Patient was found by pedestrians behind the [facility] hotel in [place].  Patient is believed to be homeless.  Patient is unkempt.  In the emergency department, patient's vital signs revealed the patient had a fever of 103.3 °F, patient was tachycardic at 104, hypertensive with a blood pressure of 176/114,  tachypneic with a respiratory rate of 34, and patient was oxygenating in the 80s and thus required supplemental oxygen.     CT scan of the head without contrast revealed no acute abnormalities.  CT scan of the chest, abdomen, and pelvis with contrast revealed a possible mass versus pericardial cyst noted in the right lower chest adjacent to the right heart border in the infrahilar area measuring 3.5 cm in diameter.  Area of consolidation medially in the right middle lobe.  Mild mediastinal and hilar adenopathy.  Mild cardiomegaly.  In the emergency department the patient was given 2.5 L of normal saline and was started on Zosyn and vancomycin.    On eval 3/31 in ICU, pt AOx4, slurring speech, baseline per pt. Hx of CVA. States he was feeling tired and SOB so he sat down on the street and stayed there till he was found. Smokes 1PPD daily, drinks EtOH daily. DNR/DNI. States he was [facility] [place] [facility]. States no work up wad done that he knows of, and nothing done in [facility] per pt.     Consultants:  Intensivist    Interval Events:  Tmax 103.3  Stach  sBP 120-130s  Nasal cannula   Vanc and unasyn (zosyn stopped 3/31)  Transfer    Review of Systems   Constitutional: Positive for chills, fever and malaise/fatigue.   HENT: Negative.  Negative for sinus pain and sore throat.    Eyes: Negative.  Negative for blurred vision.   Respiratory: Positive for cough. Negative for hemoptysis, sputum production and  "shortness of breath.    Cardiovascular: Negative.  Negative for chest pain, palpitations, orthopnea and leg swelling.   Gastrointestinal: Negative.  Negative for abdominal pain, diarrhea, heartburn, nausea and vomiting.   Genitourinary: Negative.  Negative for dysuria, frequency, hematuria and urgency.   Musculoskeletal: Negative.  Negative for myalgias.   Skin: Negative.  Negative for rash.   Neurological: Negative.  Negative for dizziness and headaches.   Endo/Heme/Allergies: Negative.         PHYSICAL EXAM  Vitals:    03/31/19 1000 03/31/19 1100 03/31/19 1200 03/31/19 1300   BP:       Pulse: (!) 110 91 84 77   Resp: (!) 21 20 19 (!) 21   Temp: 37.4 °C (99.3 °F)      TempSrc: Temporal      SpO2: 93% 96% 96% 98%   Weight:       Height:         Body mass index is 26.6 kg/m².  BP (!) 176/114   Pulse 77   Temp 37.4 °C (99.3 °F) (Temporal)   Resp (!) 21   Ht 1.829 m (6' 0.01\")   Wt 89 kg (196 lb 3.4 oz)   SpO2 98%   BMI 26.60 kg/m²   O2 therapy: Pulse Oximetry: 98 %, O2 (LPM): 4, O2 Delivery: Silicone Nasal Cannula    Physical Exam   Constitutional: He is oriented to person, place, and time. No distress.   Unkempt, disheveled    HENT:   Mouth/Throat: Mucous membranes are dry. No oropharyngeal exudate.   Abrasion on scalp   Eyes: Pupils are equal, round, and reactive to light. Conjunctivae and EOM are normal. No scleral icterus.   Neck: Neck supple. No JVD present.   Cardiovascular: Regular rhythm and normal heart sounds.  Tachycardia present.    No murmur heard.  Pulmonary/Chest: Effort normal. No respiratory distress. He has no wheezes. He has no rales.   Abdominal: Soft. Bowel sounds are normal. He exhibits no distension. There is no tenderness. There is no rebound and no guarding.   Musculoskeletal:   RLE erythema, hot, edema. LLE chronic ulcer, erythema, not hot. Multiple abrasion bilaterally. Onychomycosis    Lymphadenopathy:     He has no cervical adenopathy.   Neurological: He is alert and oriented to " person, place, and time. No cranial nerve deficit.   Slurred speech    Skin: Skin is warm and dry. He is not diaphoretic.   Multiple abrasions on extremities      Respiratory:     Respiration: (!) 21, Pulse Oximetry: 98 %    HemoDynamics:  Pulse: 77, Heart Rate (Monitored): 80 Blood Pressure : (!) 176/114, NIBP: 127/79      Fluids:    Date 03/31/19 0700 - 04/01/19 0659   Shift 1114-4195 4729-1075 5449-1820 24 Hour Total   I  N  T  A  K  E   I.V. 1870   1870      Magnesium Sulfate Volume 100   100      IV Volume (TKO) 20   20      Volume (mL) (lactated ringers infusion) 250   250      Volume (mL) (NS infusion 2,500 mL) 1500   1500    IV Piggyback 1339.7   1339.7      Volume (mL) (NS (BOLUS) infusion 1,000 mL) 0   0      Volume (mL) (piperacillin-tazobactam (ZOSYN) 4.5 g in  mL IVPB) 0   0      Volume (mL) (piperacillin-tazobactam (ZOSYN) 4.5 g in  mL IVPB) 117.9   117.9      Volume (mL) (vancomycin 1,300 mg in  mL IVPB) 208.3   208.3      Volume (mL) (calcium GLUConate 1 g in  mL IVPB) 408.3   408.3      Volume (mL) (potassium chloride (KCL) ivpb 10 mEq) 105   105      Volume (mL) (piperacillin-tazobactam (ZOSYN) 4.5 g in  mL IVPB) 0   0      Volume (mL) (vancomycin 2,400 mg in  mL IVPB) 500.1   500.1    Shift Total 3209.7   3209.7   O  U  T  P  U  T   Urine 350   350      Number of Times Voided 1 x   1 x      Urine Void (mL) 350   350    Shift Total 350   350   NET 2859.7   2859.7        Intake/Output Summary (Last 24 hours) at 03/31/19 1333  Last data filed at 03/31/19 1200   Gross per 24 hour   Intake          5740.93 ml   Output             1070 ml   Net          4670.93 ml       Weight: 89 kg (196 lb 3.4 oz)  Body mass index is 26.6 kg/m².    Recent Labs      03/30/19   1745  03/30/19 2036 03/31/19   0345   SODIUM  138   --   140   POTASSIUM  3.6   --   3.7   CHLORIDE  106   --   109   CO2  23   --   25   BUN  10   --   12   CREATININE  0.73   --   0.83   MAGNESIUM   --     --   1.5   PHOSPHORUS   --   3.0   --    CALCIUM  8.7   --   7.8*       GI/Nutrition:  Recent Labs      19   0345   ALTSGPT  11  9   ASTSGOT  17  14   ALKPHOSPHAT  80  54   TBILIRUBIN  1.5  1.8*   GLUCOSE  95  100*       Heme:  Recent Labs      19   034   RBC  5.36   --   5.02   HEMOGLOBIN  15.2   --   14.2   HEMATOCRIT  46.5   --   43.6   PLATELETCT  246   --   215   PROTHROMBTM   --   15.6*   --    APTT   --   34.5   --    INR   --   1.23*   --        Infectious Disease:  Temp  Av.6 °C (99.7 °F)  Min: 36.6 °C (97.8 °F)  Max: 39.6 °C (103.3 °F)  Recent Labs      19   WBC   --   20.1*  20.4*   NEUTSPOLYS   --   93.00*  92.30*   LYMPHOCYTES   --   2.30*  3.70*   MONOCYTES   --   3.70  3.10   EOSINOPHILS   --   0.00  0.00   BASOPHILS   --   0.30  0.30   ASTSGOT  17   --   14   ALTSGPT  11   --   9   ALKPHOSPHAT  80   --   54   TBILIRUBIN  1.5   --   1.8*       Meds:  • nicotine  14 mg     • ampicillin-sulbactam (UNASYN) IV  3 g Stopped (19 1256)   • senna-docusate  2 Tab      And   • polyethylene glycol/lytes  1 Packet      And   • magnesium hydroxide  30 mL      And   • bisacodyl  10 mg     • Respiratory Care per Protocol       • NS  1,000 mL     • LR   125 mL/hr at 19 0637   • enoxaparin  40 mg     • hydrALAZINE  10 mg     • ondansetron  4 mg     • ondansetron  4 mg     • acetaminophen  650 mg     • MD Alert...Vancomycin per Pharmacy       • vancomycin  15 mg/kg Stopped (19 1220)     Imaging:  US-EXTREMITY VENOUS LOWER BILAT   Final Result      CT-CHEST,ABDOMEN,PELVIS WITH   Final Result      1.  Possible mass versus pericardial cyst noted in the right lower chest adjacent to the right heart border in the infrahilar area measuring 3.5 cm in diameter. Lung carcinoma is in the differential diagnosis.      2.  Additional area of consolidation medially in the right middle lobe is located  more anteriorly in the right hemithorax. Pneumonia is a possibility.      3.  Mild mediastinal and hilar adenopathy is noted.      4.  Mild cardiomegaly.      5.  Post cholecystectomy.      6.  Small renal cyst is noted in each kidney.      7.  No acute abnormalities noted in the abdomen or pelvis.         CT-HEAD W/O   Final Result         NO ACUTE ABNORMALITIES ARE NOTED ON CT SCAN OF THE HEAD.         DX-CHEST-PORTABLE (1 VIEW)   Final Result         1.  Wedge-shaped focal opacification possibly representing consolidation is identified along the right heart border likely in the right middle pulmonary lobe. Pneumonia is a possibility. Consolidative atelectasis is also possible. Underlying carcinoma is    in the differential diagnosis.          Problem and Plan:    * Sepsis (HCC)- (present on admission)   Assessment & Plan    This is sepsis (without associated acute organ dysfunction).    Source cellulitis vs post-obstructive pneumonia or component of both  Procal 0.91  Influenza negative   IV fluids  Blood cultures 3/31 NGTD  Wound cultures 3/31 NGTD  Respiratory cultures pending   Started vanc and zosyn changed to Unasyn 3/31     Altered mental status- (present on admission)   Assessment & Plan    Resolved, UDS and EtOH negative   Possible due to sepsis     Lung mass- (present on admission)   Assessment & Plan    Presumed lung mass noted on CT on admission  States he was [facility] [place] [facility] but nothing was done per pt  Will need records   Pulm consult for EBUS once out of ICU     Bandemia- (present on admission)   Assessment & Plan    Please refer to cellulitis and sepsis assessment and plan     SIRS (systemic inflammatory response syndrome) (HCC)- (present on admission)   Assessment & Plan    Please refer to sepsis assessment and plan     Cellulitis- (present on admission)   Assessment & Plan    R>L, L appears to be chronic ulcer  Wound cultures, wound care  Vanc and unasyn      Malignancy  (HCC)- (present on admission)   Assessment & Plan    Please refer to lung mass assessment and plan     Pneumonia- (present on admission)   Assessment & Plan    Patient appears to have a postobstructive pneumonia secondary to the right lung mass noted on CT  Procal 0.91  Blood cultures and sputum cultures  Vanc and unasyn     Alcohol use- (present on admission)   Assessment & Plan    States daily use, EtOH 0 on admission   Monitor for withdrawl     Hypertension- (present on admission)   Assessment & Plan    PRN meds. Has hx of but takes no meds     Fever- (present on admission)   Assessment & Plan    Secondary to infection, lung vs skin  Tylenol        Homeless- (present on admission)   Assessment & Plan    Social service consult      Hypomagnesemia- (present on admission)   Assessment & Plan    Replete as needed     Hypokalemia- (present on admission)   Assessment & Plan    Replete as needed     Tobacco abuse- (present on admission)   Assessment & Plan    Nicotine patches, counseled      Tachycardia- (present on admission)   Assessment & Plan    Improving following IV fluid resuscitation          DISPO: ICU, transfer  CODE STATUS: DNR/DNI    Quality Measures:  Hines Catheter: No  DVT Prophylaxis: Enoxaparin   Ulcer Prophylaxis: no  Antibiotics: yes  Lines: PIV

## 2019-03-31 NOTE — ASSESSMENT & PLAN NOTE
This is sepsis (without associated acute organ dysfunction).    Source cellulitis vs post-obstructive pneumonia or component of both  Procal 0.91  Influenza negative   IV fluids  Blood cultures 3/31 NGTD  Wound cultures 4/1/gram-positive cocci  Respiratory cultures pending     Plan:  -Discontinued vancomycin as the foot wounds seem unlikely to be infected  -Continue Unasyn  -Continue IV fluids LR at 83 cc/h

## 2019-03-31 NOTE — PROGRESS NOTES
[facility]. All belongings and chart with patient. Receiving RN and CNA notified.

## 2019-03-31 NOTE — H&P
Internal Medicine Admitting History and Physical    Note Author: Tank Schmitz M.D.       Name Nestor Strong       1950   Age/Sex 68 y.o. male   MRN [id number]   Code Status  Full code     After 5PM or if no immediate response to page, please call for cross-coverage  Attending/Team: [facility] team See Patient List for primary contact information  Call [phone] to page      Chief Complaint:   Sepsis    HPI:    HPI was limited due to patient being in an altered mental status and mumbling, I was unable to understand patients responses.    Mr Strong is a 68-year-old male who was brought in by ambulance.  Patient was found by pedestrians behind the [facility] in [place].  Patient is believed to be homeless.  Patient is unkempt.    In the emergency department, patient's vital signs revealed the patient had a fever of 103.3 °F, patient was tachycardic at 104, hypertensive with a blood pressure of 176/114,  tachypneic with a respiratory rate of 34, and patient was oxygenating in the 80s and thus required supplemental oxygen.    CBC revealed leukocytosis with neutrophilia.  Chemistry panel was normal    Initial chest x-ray revealed a wedge-shaped focal opacification which possibly represented consolidation along the right heart border likely in the right middle pulmonary lobe.    CT scan of the head without contrast revealed no acute abnormalities.    CT scan of the chest, abdomen, and pelvis with contrast revealed a possible mass versus pericardial cyst noted in the right lower chest adjacent to the right heart border in the infrahilar area measuring 3.5 cm in diameter.  Area of consolidation medially in the right middle lobe.  Mild mediastinal and hilar adenopathy.  Mild cardiomegaly.    In the emergency department the patient was given 2.5 L of normal saline and was started on Zosyn and vancomycin.    Review of Systems   Unable to perform ROS: Acuity of condition             Past Medical History  "(Chronic medical problem, known complications and current treatment)    Past Medical History:   Diagnosis Date   • Diabetes    • Homeless    • Schizophrenia        Past Surgical History:  Past Surgical History:   Procedure Laterality Date   • MASS EXCISION GENERAL  10/1/2010    Performed by SHANNEN LENNON at SURGERY [facility] ORS   • HERNIA REPAIR         Current Outpatient Medications:  Home Medications     Reviewed by Caro Ennis R.N. (Registered Nurse) on 03/30/19 at 2122  Med List Status: <None>   Medication Last Dose Status        Patient Johnnie Taking any Medications                       Medication Allergy/Sensitivities:  Allergies   Allergen Reactions   • Tape          Family History (mandatory)   No family history on file.    Social History (mandatory)   Social History     Social History   • Marital status: Single     Spouse name: N/A   • Number of children: N/A   • Years of education: N/A     Occupational History   • Not on file.     Social History Main Topics   • Smoking status: Current Every Day Smoker   • Smokeless tobacco: Not on file   • Alcohol use Yes      Comment: occ   • Drug use: No   • Sexual activity: Not on file     Other Topics Concern   • Not on file     Social History Narrative   • No narrative on file     Living situation: Possibly homeless  PCP : Pcp Pt States None    Physical Exam     Vitals:    03/30/19 2200 03/30/19 2215 03/30/19 2230 03/30/19 2245   BP:       Pulse: (!) 118 (!) 117 (!) 117 (!) 116   Resp: (!) 21 (!) 22 (!) 25 (!) 26   Temp:       TempSrc:       SpO2: 99% 95% 95% 95%   Weight:       Height:         Body mass index is 26.6 kg/m².  BP (!) 176/114   Pulse (!) 116   Temp 36.6 °C (97.8 °F) (Temporal)   Resp (!) 26   Ht 1.829 m (6' 0.01\")   Wt 89 kg (196 lb 3.4 oz)   SpO2 95%   BMI 26.60 kg/m²   O2 therapy: Pulse Oximetry: 95 %, O2 (LPM): 4, O2 Delivery: Silicone Nasal Cannula    Physical Exam   Constitutional: He appears unhealthy. He has a sickly appearance. "   HENT:   Head: Normocephalic and atraumatic.   Mouth/Throat: Abnormal dentition.   Eyes: Pupils are equal, round, and reactive to light.   Neck: Normal range of motion. Neck supple. No thyromegaly present.   Cardiovascular: Regular rhythm and normal heart sounds.  Tachycardia present.    No murmur heard.  Pulmonary/Chest: Effort normal and breath sounds normal. No respiratory distress. He has no wheezes.   Abdominal: Soft. Bowel sounds are normal. He exhibits no distension.   scar   Lymphadenopathy:     He has no cervical adenopathy.   Neurological:   Patient was sleepy but was able to follow commands.  Difficult to understand patient's when he spoke   Skin:              Data Review       Old Records Request:   Completed  Current Records review/summary: Completed    Lab Data Review:  Recent Results (from the past 24 hour(s))   Influenza A/B By PCR (Adult - Flu Only)    Collection Time: 03/30/19 12:36 AM   Result Value Ref Range    Influenza virus A RNA Negative Negative    Influenza virus B, PCR Negative Negative   URINALYSIS    Collection Time: 03/30/19  5:30 PM   Result Value Ref Range    Color Yellow     Character Clear     Specific Gravity 1.017 <1.035    Ph 7.0 5.0 - 8.0    Glucose Negative Negative mg/dL    Ketones Negative Negative mg/dL    Protein 30 (A) Negative mg/dL    Bilirubin Negative Negative    Urobilinogen, Urine 1.0 Negative    Nitrite Negative Negative    Leukocyte Esterase Negative Negative    Occult Blood Negative Negative    Micro Urine Req Microscopic    URINE MICROSCOPIC (W/UA)    Collection Time: 03/30/19  5:30 PM   Result Value Ref Range    WBC 10-20 (A) /hpf    RBC 2-5 (A) /hpf    Bacteria Negative None /hpf    Epithelial Cells Negative /hpf    Hyaline Cast 0-2 /lpf    Sperm Moderate /hpf   URINE DRUG SCREEN    Collection Time: 03/30/19  5:38 PM   Result Value Ref Range    Amphetamines Urine Negative Negative    Barbiturates Negative Negative    Benzodiazepines Negative Negative     Cocaine Metabolite Negative Negative    Methadone Negative Negative    Opiates Negative Negative    Oxycodone Negative Negative    Phencyclidine -Pcp Negative Negative    Propoxyphene Negative Negative    Cannabinoid Metab Negative Negative   Lactic acid (lactate)    Collection Time: 03/30/19  5:45 PM   Result Value Ref Range    Lactic Acid 1.8 0.5 - 2.0 mmol/L   COMP METABOLIC PANEL    Collection Time: 03/30/19  5:45 PM   Result Value Ref Range    Sodium 138 135 - 145 mmol/L    Potassium 3.6 3.6 - 5.5 mmol/L    Chloride 106 96 - 112 mmol/L    Co2 23 20 - 33 mmol/L    Anion Gap 9.0 0.0 - 11.9    Glucose 95 65 - 99 mg/dL    Bun 10 8 - 22 mg/dL    Creatinine 0.73 0.50 - 1.40 mg/dL    Calcium 8.7 8.5 - 10.5 mg/dL    AST(SGOT) 17 12 - 45 U/L    ALT(SGPT) 11 2 - 50 U/L    Alkaline Phosphatase 80 30 - 99 U/L    Total Bilirubin 1.5 0.1 - 1.5 mg/dL    Albumin 3.8 3.2 - 4.9 g/dL    Total Protein 6.9 6.0 - 8.2 g/dL    Globulin 3.1 1.9 - 3.5 g/dL    A-G Ratio 1.2 g/dL   ESTIMATED GFR    Collection Time: 03/30/19  5:45 PM   Result Value Ref Range    GFR If African American >60 >60 mL/min/1.73 m 2    GFR If Non African American >60 >60 mL/min/1.73 m 2   CBC WITH DIFFERENTIAL    Collection Time: 03/30/19  6:50 PM   Result Value Ref Range    WBC 20.1 (H) 4.8 - 10.8 K/uL    RBC 5.36 4.70 - 6.10 M/uL    Hemoglobin 15.2 14.0 - 18.0 g/dL    Hematocrit 46.5 42.0 - 52.0 %    MCV 86.8 81.4 - 97.8 fL    MCH 28.4 27.0 - 33.0 pg    MCHC 32.7 (L) 33.7 - 35.3 g/dL    RDW 48.6 35.9 - 50.0 fL    Platelet Count 246 164 - 446 K/uL    MPV 9.3 9.0 - 12.9 fL    Neutrophils-Polys 93.00 (H) 44.00 - 72.00 %    Lymphocytes 2.30 (L) 22.00 - 41.00 %    Monocytes 3.70 0.00 - 13.40 %    Eosinophils 0.00 0.00 - 6.90 %    Basophils 0.30 0.00 - 1.80 %    Immature Granulocytes 0.70 0.00 - 0.90 %    Nucleated RBC 0.00 /100 WBC    Neutrophils (Absolute) 18.64 (H) 1.82 - 7.42 K/uL    Lymphs (Absolute) 0.47 (L) 1.00 - 4.80 K/uL    Monos (Absolute) 0.74 0.00 - 0.85  K/uL    Eos (Absolute) 0.00 0.00 - 0.51 K/uL    Baso (Absolute) 0.06 0.00 - 0.12 K/uL    Immature Granulocytes (abs) 0.15 (H) 0.00 - 0.11 K/uL    NRBC (Absolute) 0.00 K/uL   Lactic Acid -STAT Once    Collection Time: 19  8:36 PM   Result Value Ref Range    Lactic Acid 1.0 0.5 - 2.0 mmol/L   Phosphorus    Collection Time: 19  8:36 PM   Result Value Ref Range    Phosphorus 3.0 2.5 - 4.5 mg/dL   Procalcitonin    Collection Time: 19  8:36 PM   Result Value Ref Range    Procalcitonin 0.91 (H) <0.25 ng/mL   EKG    Collection Time: 19 10:08 PM   Result Value Ref Range    Report       [facility]    Test Date:  2019  Pt Name:    ANN GOODWIN                Department: [facility]  MRN:        [id number]                      Room:       [facility]  Gender:     Male                         Technician: JUVE  :        1950                   Requested By:VELIA ZAMORA  Order #:    [id number]                    Reading MD:    Measurements  Intervals                                Axis  Rate:       118                          P:          48  PA:         163                          QRS:        -49  QRSD:       87                           T:          47  QT:         339  QTc:        476    Interpretive Statements  SINUS TACHYCARDIA  LEFT ANTERIOR FASCICULAR BLOCK  ANTERIOR INFARCT, OLD  Compared to ECG 10/01/2010 16:19:51  Left anterior fascicular block now present  Myocardial infarct finding now present  Sinus rhythm no longer present         Imaging/Procedures Review:    Independant Imaging Review: Completed  US-EXTREMITY VENOUS LOWER BILAT   Final Result      CT-CHEST,ABDOMEN,PELVIS WITH   Final Result      1.  Possible mass versus pericardial cyst noted in the right lower chest adjacent to the right heart border in the infrahilar area measuring 3.5 cm in diameter. Lung carcinoma is in the differential diagnosis.      2.  Additional area of consolidation medially in the right middle lobe is  located more anteriorly in the right hemithorax. Pneumonia is a possibility.      3.  Mild mediastinal and hilar adenopathy is noted.      4.  Mild cardiomegaly.      5.  Post cholecystectomy.      6.  Small renal cyst is noted in each kidney.      7.  No acute abnormalities noted in the abdomen or pelvis.         CT-HEAD W/O   Final Result         NO ACUTE ABNORMALITIES ARE NOTED ON CT SCAN OF THE HEAD.         DX-CHEST-PORTABLE (1 VIEW)   Final Result         1.  Wedge-shaped focal opacification possibly representing consolidation is identified along the right heart border likely in the right middle pulmonary lobe. Pneumonia is a possibility. Consolidative atelectasis is also possible. Underlying carcinoma is    in the differential diagnosis.                  Assessment/Plan     * Sepsis (HCC)   Assessment & Plan    This is sepsis (without associated acute organ dysfunction).  68-year-old gentleman, who appears to be homeless, brought in by ambulance with sepsis.  Patient has SIRS 4 out of 4.  Patient's source of acute infection could be a postobstructive lung process due to mass noted on CT scan or patient's cellulitis in his lower extremity.  Patient was started on sepsis protocol, given intravenous fluid resuscitation and was treated with vancomycin and Zosyn.  Patient had blood cultures drawn and sputum cultures pending.  Patient's urine drug screen was negative.  Plan  -Admit to the ICU  - Cardiac monitoring  - Intravenous fluid resuscitation, precaution will be taken due to cardiomegaly noted on CT  -Vanco and Zosyn to continue until cultures return and were able to narrow down antibiotic choice       Lung mass   Assessment & Plan    Patient has a mass that was noted on CT scan.  The differential diagnosis for this includes malignancy.  Once patient is stable, biopsy may be considered.     Bandemia   Assessment & Plan    Please refer to cellulitis and sepsis assessment and plan     SIRS (systemic  inflammatory response syndrome) (HCC)   Assessment & Plan    Please refer to sepsis assessment and plan     Cellulitis   Assessment & Plan    Patient has 2 lower extremity wounds, one in each lower extremity.  Patient has what appears to be cellulitis of both lower extremities.  Calor and erythema were noted in the areas around these wounds.  Patient has poor hygiene and this could be exacerbating the patient's wounds.  Plan  -Patient was started on vancomycin and Zosyn  - Wound care was ordered  -Wound cultures ordered     Malignancy (HCC)   Assessment & Plan    Please refer to lung mass assessment and plan     Pneumonia   Assessment & Plan    Patient appears to have a postobstructive pneumonia secondary to the right lung mass noted on CT.  Plan  -Blood cultures and sputum cultures  - Zosyn and vancomycin pending cultures to de-escalate     Tachycardia   Assessment & Plan    Please refer to sepsis assessment and plan     Tachypnea   Assessment & Plan    Please refer to sepsis assessment and plan       Hypertension   Assessment & Plan    Patient initially presented with hypertension but blood pressure is currently within normal limits.  Plan  - Hypertensive PRN medications ordered     Fever   Assessment & Plan    Likely secondary to infectious process.  Plan  - Treat infectious process  - Tylenol ordered         Anticipated Hospital stay:  >2 midnights        Quality Measures  Quality-Core Measures  PCP: Pcp Pt States None

## 2019-03-31 NOTE — PROGRESS NOTES
· 2 RN skin check complete with Noemí LEZAMA  · Devices in place: pulse ox, BP cuff, cardiac monitor leads, PIV  · Skin assessed under devices: Yes.  · Confirmed pressure ulcers found on: BLE red hot and multiple wounds, BLE feet black but pulses +2, coccyx, buttocks, L hand 4th digit, generalized skin issues d/t lack of hygiene   · The following interventions in place: Q2H turns and repositioning. Heels and elbows floated on pillows.  Ensure patient is not laying on tubing/lines. Mepilex to sacrum.

## 2019-03-31 NOTE — CARE PLAN
Problem: Safety  Goal: Free from accidental injury    Intervention: Initiate Safety Measures  Bed in low, locked position, near nursing station, call light within reach. Bed alarm activated, appropriate fall identifiers in place.      Problem: Skin Integrity  Goal: Skin Integrity is maintained or improved    Intervention: Turn every 2 hours while on bedrest  Turning and repositioning q2h with pillows, ensure all medical devices are padded and kept away from skin.

## 2019-03-31 NOTE — PROGRESS NOTES
[facility] ICU Transfer Note                                                                                     ICU Admit Date: 3/30/19  ICU Discharge Date:   3/31/19    Primary Diagnosis:   Sepsis    ICU Course Summary (Brief Narrative):  69 y/o homeless male with a PMH of hypertension, schizophrenia was brought in by EMS after being found on the ground by pedestrians, he was also altered at the time. On eval 3/31 in ICU, pt AOx4, slurred speech, baseline per pt, states hx of CVA. States he was feeling tired and SOB so he sat down on the street and stayed there till he was found. Smokes 1PPD daily, drinks EtOH daily. DNR/DNI. States he was [facility] [place] many [facility]. States no work up was done that he knows of, and nothing done in [facility] per pt.     CT head was negative. CXR showed probable pneumonia versus mass.  CT chest showed a possible 3.5 cm mass at the right heart border as well as consolidation consistent with pneumonia.  Patient also has bilateral lower extremity swelling, erythema, warmth right more than left.  Ultrasound DVT in the ED was negative for DVT.      In the ER he was diagnosed with sepsis, 4/4 SIRS criteria.   He was started on antibiotics and admitted to the ICU.    Source of sepsis likely pulmonary versus soft tissue.  Currently on vancomycin and Unasyn.  Blood and wound cultures pending.    Need to watch for withdrawal symptoms, blood alcohol on presentation 0.  Rally bag ordered, speech eval pending after which start thiamine, folate, multivitamin oral versus IV.    He will need to be evaluated by pulmonary for the mass seen on the CT chest for EBUS.    Labs and imaging studies to be continued with their indications:  - CBC:  (Indication) Sepsis  - CMP or BMP: (Indication) hypokalemia  - Magnesium: (Indication) hypomagnesemia    Things to follow:  Cultures, follow-up with pulmonary for EBUS

## 2019-03-31 NOTE — ASSESSMENT & PLAN NOTE
Presumed, possible aspiration versus postobstructive  Change Zosyn to Unasyn given low risk for Pseudomonas  Follow-up on cultures  Trend pro-calcitonin every 48 hours

## 2019-03-31 NOTE — THERAPY
"  Speech Language Therapy Clinical Swallow Evaluation completed.  Functional Status: Patient was seen for a clinical bedside swallow evaluation this date. Per RN patient with difficulties with a dysphagia 3 diet with thin liquids after RN swallow screen. Patient with increased episodes of coughing per report. Patient was lethargic and sleepy, however able to arouse but required direct stimulation to maintain adequate DAK[place]. Patient consumed PO trials of thins, nectars, and purees. Patient with edentulous and overall weakness noted. Patient demonstrated oral holding, delayed onset of swallow, decreased laryngeal elevation and hyolaryngeal excursion noted upon palpation. Patient demonstrated delayed coughing s/p trials of thins via side of cup. Patient tolerated PO trials of NTL and consumed 4oz of puree with no clinical s/sx of aspiration, clear vocal quality, and no wet/gurgly sounds. At this time recommend a Dysphagia I diet with NTL. Patient must be awake and alert, consider upright in chair for all meals. HOLD tray if patient is sleepy. Monitor and assist for set up by staff. SLP to follow.   Recommendations - Diet: Diet / Liquid Recommendation: Dysphagia I, Nectar Thick Liquid                          Strategies: Direct supervision during meals, Monitor during meals, Assistance needed for meal tray set-up, No Straws and Head of Bed at 90 Degrees                          Medication Administration: Medication Administration : Crush all Medications in Puree  Plan of Care: Will benefit from Speech Therapy 3 times per week  Post-Acute Therapy: Discharge to a transitional care facility for continued skilled therapy services.    See \"Rehab Therapy-Acute\" Patient Summary Report for complete documentation.   "

## 2019-03-31 NOTE — CARE PLAN
Problem: Infection  Goal: Will remain free from infection    Intervention: Assess signs and symptoms of infection  Cleaned wounds. Educated on importance of hygiene

## 2019-03-31 NOTE — ASSESSMENT & PLAN NOTE
This is sepsis (without associated acute organ dysfunction).   Source = possible lungs, soft tissue    Status post sepsis protocol  Continue antibiotic (changed to Unasyn and vancomycin)  Follow-up on cultures  Monitor urine output and vital signs closely for adequacy of resuscitation

## 2019-03-31 NOTE — PROGRESS NOTES
Critical Care Progress Note    Date of admission  3/30/2019    Chief Complaint  68 y.o. male admitted 3/30/2019 with AMS    Hospital Course    Patient is a 68-year-old homeless, schizophrenic male who was brought in by EMS after he was found down by pedestrians.  He was febrile in the emergency department up to 103.3 °F and hypoxic.  Chest x-ray revealed probable pneumonia and CT scan showed possible mass.  He was given IV fluids as well as started on antibiotics in the emergency department and admitted to the intensive care unit on 3/30      Interval Problem Update  Reviewed last 24 hour events:   - sinus tach   - Tm 39.6, WBC remains elevated   - CT head ok   - neg LE DVT   - rec'd 2.5L NS, MIVFs LR @ 125   - AAOx3, slurred speech, no other deficits   - -150   - not passing bedside swallow --> SLP   - ok UOP   - low K/Mag   - vanco/zosyn day 2 --> change to unasyn/vanco (PNA + skin)   - lactic acid neg   - monitor for ETOH withdrawal, vitamins   - code status to DNR/DNI   - Patient reports that he was diagnosed with a benign lung tumor at the [facility] in the past but does not recall any additional details    Review of Systems  Review of Systems   Constitutional: Positive for malaise/fatigue. Negative for chills, fever and weight loss.   HENT: Negative for congestion and sore throat.    Eyes: Negative for blurred vision.   Respiratory: Positive for cough and sputum production. Negative for hemoptysis, shortness of breath and wheezing.    Cardiovascular: Negative for chest pain.   Gastrointestinal: Negative for abdominal pain, nausea and vomiting.   Genitourinary: Negative for dysuria.   Musculoskeletal: Negative for back pain and myalgias.   Skin: Negative for rash.   Neurological: Negative for dizziness and headaches.   Endo/Heme/Allergies: Does not bruise/bleed easily.   Psychiatric/Behavioral: Negative for depression and hallucinations.   All other systems reviewed and are negative.       Vital Signs  for last 24 hours   Temp:  [36.6 °C (97.8 °F)-39.6 °C (103.3 °F)] 37.4 °C (99.3 °F)  Pulse:  [] 103  Resp:  [6-51] 26  BP: (176)/(114) 176/114  SpO2:  [90 %-99 %] 93 %    Respiratory Information for the last 24 hours   4 lpm n/c    Physical Exam   Physical Exam   Constitutional: He is oriented to person, place, and time. He appears well-developed. No distress.   Very disheveled, unkempt, slurred speech that per patient his baseline   HENT:   Head: Normocephalic and atraumatic.   Nose: Nose normal.   Dry oral mucosa membranes, sunken eyes   Eyes: Pupils are equal, round, and reactive to light. Conjunctivae are normal. No scleral icterus.   Neck: Neck supple. No JVD present. No tracheal deviation present.   Cardiovascular: Regular rhythm and intact distal pulses.   Occasional extrasystoles are present. Tachycardia present.  PMI is not displaced.  Exam reveals no distant heart sounds and no decreased pulses.    No murmur heard.  Pulmonary/Chest: Effort normal. No tachypnea. No respiratory distress. He has no decreased breath sounds. He has no wheezes. He has rhonchi in the right lower field.   Abdominal: Soft. Bowel sounds are normal. He exhibits no distension. There is no tenderness. There is no rebound.   Musculoskeletal: He exhibits edema. He exhibits no tenderness or deformity.   Right lower extremity erythematous, hot to the touch, left lower extremity with chronic ulcers that do not appear to be infected   Neurological: He is alert and oriented to person, place, and time. No cranial nerve deficit. He exhibits normal muscle tone.   Drowsy but arouses easily, oriented x4, slurred speech, no focal deficits   Skin: Skin is warm and dry. He is not diaphoretic. No pallor.   Psychiatric: His behavior is normal. Thought content normal.   Odd affect   Nursing note and vitals reviewed.      Medications  Current Facility-Administered Medications   Medication Dose Route Frequency Provider Last Rate Last Dose   •  magnesium sulfate IVPB premix 4 g  4 g Intravenous Once Tank Schmitz M.D. 25 mL/hr at 03/31/19 0617 4 g at 03/31/19 0617   • potassium chloride (KCL) ivpb 10 mEq  10 mEq Intravenous Q HOUR Tank Schmitz M.D. 100 mL/hr at 03/31/19 0717 10 mEq at 03/31/19 0717   • nicotine (NICODERM) 14 MG/24HR 14 mg  14 mg Transdermal Daily-0600 Francis Livingston M.D.       • senna-docusate (PERICOLACE or SENOKOT S) 8.6-50 MG per tablet 2 Tab  2 Tab Oral BID Tank Schmitz M.D.   Stopped at 03/30/19 2100    And   • polyethylene glycol/lytes (MIRALAX) PACKET 1 Packet  1 Packet Oral QDAY PRN Tank Schmitz M.D.        And   • magnesium hydroxide (MILK OF MAGNESIA) suspension 30 mL  30 mL Oral QDAY PRN Tank Schmitz M.D.        And   • bisacodyl (DULCOLAX) suppository 10 mg  10 mg Rectal QDAY PRN Tank Schmitz M.D.       • Respiratory Care per Protocol   Nebulization Continuous RT Tank Schmitz M.D.       • NS (BOLUS) infusion 1,000 mL  1,000 mL Intravenous Once PRN Tank Schmitz M.D.       • lactated ringers infusion   Intravenous Continuous Tank Schmitz M.D. 125 mL/hr at 03/31/19 0637     • enoxaparin (LOVENOX) inj 40 mg  40 mg Subcutaneous DAILY Tank Schmitz M.D.   40 mg at 03/31/19 0555   • hydrALAZINE (APRESOLINE) injection 10 mg  10 mg Intravenous Q4HRS PRN Tank Schmitz M.D.       • ondansetron (ZOFRAN) syringe/vial injection 4 mg  4 mg Intravenous Q4HRS PRN Tank Schmitz M.D.       • ondansetron (ZOFRAN ODT) dispertab 4 mg  4 mg Oral Q4HRS PRN Tank Schmitz M.D.       • acetaminophen (TYLENOL) tablet 650 mg  650 mg Oral Q6HRS PRN Tank Schmitz M.D.       • piperacillin-tazobactam (ZOSYN) 4.5 g in  mL IVPB  4.5 g Intravenous Q8HRS Tank Schmitz M.D. 25 mL/hr at 03/31/19 0717 4.5 g at 03/31/19 0717   • MD Alert...Vancomycin per Pharmacy   Other PHARMACY TO DOSE Tank Schmitz M.D.       • Influenza Vaccine High-Dose pf injection 0.5 mL  0.5 mL Intramuscular Once  MADDIE Martinez M.D.       • vancomycin 1,300 mg in  mL IVPB  15 mg/kg Intravenous Q12HR Tank Schmitz M.D.           Fluids    Intake/Output Summary (Last 24 hours) at 03/31/19 0812  Last data filed at 03/31/19 0800   Gross per 24 hour   Intake          5127.19 ml   Output              720 ml   Net          4407.19 ml       Laboratory          Recent Labs      03/30/19 1745 03/30/19 2036 03/31/19 0345   SODIUM  138   --   140   POTASSIUM  3.6   --   3.7   CHLORIDE  106   --   109   CO2  23   --   25   BUN  10   --   12   CREATININE  0.73   --   0.83   MAGNESIUM   --    --   1.5   PHOSPHORUS   --   3.0   --    CALCIUM  8.7   --   7.8*     Recent Labs      03/30/19 1745 03/31/19 0345   ALTSGPT  11  9   ASTSGOT  17  14   ALKPHOSPHAT  80  54   TBILIRUBIN  1.5  1.8*   GLUCOSE  95  100*     Recent Labs      03/30/19 1745 03/30/19 1850 03/31/19 0345   WBC   --   20.1*  20.4*   NEUTSPOLYS   --   93.00*  92.30*   LYMPHOCYTES   --   2.30*  3.70*   MONOCYTES   --   3.70  3.10   EOSINOPHILS   --   0.00  0.00   BASOPHILS   --   0.30  0.30   ASTSGOT  17   --   14   ALTSGPT  11   --   9   ALKPHOSPHAT  80   --   54   TBILIRUBIN  1.5   --   1.8*     Recent Labs      03/30/19 1850 03/30/19 2036 03/31/19 0345   RBC  5.36   --   5.02   HEMOGLOBIN  15.2   --   14.2   HEMATOCRIT  46.5   --   43.6   PLATELETCT  246   --   215   PROTHROMBTM   --   15.6*   --    APTT   --   34.5   --    INR   --   1.23*   --        Imaging  X-Ray:  I have personally reviewed the images and compared with prior images. and My impression is: Right middle lobe consolidation versus mass with enlarged cardiac silhouette, no tubes or lines  CT:    Reviewed    Assessment/Plan  * Sepsis (HCC)   Assessment & Plan    This is sepsis (without associated acute organ dysfunction).   Source = possible lungs, soft tissue    Status post sepsis protocol  Continue antibiotic (changed to Unasyn and vancomycin)  Follow-up on  cultures  Monitor urine output and vital signs closely for adequacy of resuscitation     Altered mental status   Assessment & Plan    Improving, possibly close to baseline  History of schizophrenia  Further chart review and patient investigation to evaluate whether he takes any medications  Limit sedatives     Bandemia   Assessment & Plan    Monitor with therapy     SIRS (systemic inflammatory response syndrome) (HCC)   Assessment & Plan    Due to infection  Monitor     Cellulitis   Assessment & Plan    Right lower extremity  Continue vancomycin  Follow-up on cultures     Malignancy (HCC)   Assessment & Plan    Presumed to be lung mass on imaging  [facility]  May need endobronchial biopsy if has not had it performed in the past     Pneumonia   Assessment & Plan    Presumed, possible aspiration versus postobstructive  Change Zosyn to Unasyn given low risk for Pseudomonas  Follow-up on cultures  Trend pro-calcitonin every 48 hours     Alcohol use- (present on admission)   Assessment & Plan    Vitamin supplementation  Monitor for alcohol withdrawal syndrome  Alcohol cessation education     Hypertension   Assessment & Plan    PRN hydralazine for goal SBP less than 160     Fever   Assessment & Plan    Antipyretics     Hypomagnesemia- (present on admission)   Assessment & Plan    Repletion and monitor closely     Hypokalemia- (present on admission)   Assessment & Plan    Repletion and monitor closely     Tobacco abuse- (present on admission)   Assessment & Plan    Nicotine replacement patch  Tobacco cessation education     Tachycardia   Assessment & Plan    Remains tachycardic today  Continue IV fluids pending SLP eval     DNR/DNI    VTE:  Lovenox  Ulcer: Not Indicated  Lines: None    I have performed a physical exam and reviewed and updated ROS and Plan today (3/31/2019). In review of yesterday's note (3/30/2019), there are no changes except as  documented above.     Discussed patient condition and risk of morbidity and/or mortality with RN, RT, Pharmacy, [employer] resident, Charge nurse / hot rounds and Patient

## 2019-04-01 PROBLEM — L97.909 VENOUS STASIS ULCERS (HCC): Status: ACTIVE | Noted: 2019-04-01

## 2019-04-01 PROBLEM — I83.009 VENOUS STASIS ULCERS (HCC): Status: ACTIVE | Noted: 2019-04-01

## 2019-04-01 LAB
ALBUMIN SERPL BCP-MCNC: 3.1 G/DL (ref 3.2–4.9)
BUN SERPL-MCNC: 12 MG/DL (ref 8–22)
CALCIUM SERPL-MCNC: 8.2 MG/DL (ref 8.5–10.5)
CHLORIDE SERPL-SCNC: 105 MMOL/L (ref 96–112)
CO2 SERPL-SCNC: 27 MMOL/L (ref 20–33)
CREAT SERPL-MCNC: 0.73 MG/DL (ref 0.5–1.4)
GLUCOSE SERPL-MCNC: 94 MG/DL (ref 65–99)
PHOSPHATE SERPL-MCNC: 2 MG/DL (ref 2.5–4.5)
POTASSIUM SERPL-SCNC: 3.8 MMOL/L (ref 3.6–5.5)
PROCALCITONIN SERPL-MCNC: 1.66 NG/ML
SODIUM SERPL-SCNC: 136 MMOL/L (ref 135–145)
VANCOMYCIN TROUGH SERPL-MCNC: 16.3 UG/ML (ref 10–20)

## 2019-04-01 PROCEDURE — 700102 HCHG RX REV CODE 250 W/ 637 OVERRIDE(OP): Performed by: STUDENT IN AN ORGANIZED HEALTH CARE EDUCATION/TRAINING PROGRAM

## 2019-04-01 PROCEDURE — 700111 HCHG RX REV CODE 636 W/ 250 OVERRIDE (IP): Performed by: STUDENT IN AN ORGANIZED HEALTH CARE EDUCATION/TRAINING PROGRAM

## 2019-04-01 PROCEDURE — 80202 ASSAY OF VANCOMYCIN: CPT

## 2019-04-01 PROCEDURE — 700101 HCHG RX REV CODE 250: Performed by: STUDENT IN AN ORGANIZED HEALTH CARE EDUCATION/TRAINING PROGRAM

## 2019-04-01 PROCEDURE — 36415 COLL VENOUS BLD VENIPUNCTURE: CPT

## 2019-04-01 PROCEDURE — 700105 HCHG RX REV CODE 258: Performed by: INTERNAL MEDICINE

## 2019-04-01 PROCEDURE — 700105 HCHG RX REV CODE 258: Performed by: STUDENT IN AN ORGANIZED HEALTH CARE EDUCATION/TRAINING PROGRAM

## 2019-04-01 PROCEDURE — 770006 HCHG ROOM/CARE - MED/SURG/GYN SEMI*

## 2019-04-01 PROCEDURE — 700102 HCHG RX REV CODE 250 W/ 637 OVERRIDE(OP): Performed by: INTERNAL MEDICINE

## 2019-04-01 PROCEDURE — A9270 NON-COVERED ITEM OR SERVICE: HCPCS | Performed by: STUDENT IN AN ORGANIZED HEALTH CARE EDUCATION/TRAINING PROGRAM

## 2019-04-01 PROCEDURE — 99232 SBSQ HOSP IP/OBS MODERATE 35: CPT | Performed by: INTERNAL MEDICINE

## 2019-04-01 PROCEDURE — 80069 RENAL FUNCTION PANEL: CPT

## 2019-04-01 PROCEDURE — 99232 SBSQ HOSP IP/OBS MODERATE 35: CPT | Mod: GC | Performed by: INTERNAL MEDICINE

## 2019-04-01 PROCEDURE — A9270 NON-COVERED ITEM OR SERVICE: HCPCS | Performed by: INTERNAL MEDICINE

## 2019-04-01 PROCEDURE — 700111 HCHG RX REV CODE 636 W/ 250 OVERRIDE (IP): Performed by: INTERNAL MEDICINE

## 2019-04-01 PROCEDURE — 84145 PROCALCITONIN (PCT): CPT

## 2019-04-01 RX ORDER — PETROLATUM 42 G/100G
OINTMENT TOPICAL PRN
Status: DISCONTINUED | OUTPATIENT
Start: 2019-04-01 | End: 2019-04-02 | Stop reason: HOSPADM

## 2019-04-01 RX ADMIN — NICOTINE 14 MG: 14 PATCH, EXTENDED RELEASE TRANSDERMAL at 04:57

## 2019-04-01 RX ADMIN — AMPICILLIN SODIUM AND SULBACTAM SODIUM 3 G: 2; 1 INJECTION, POWDER, FOR SOLUTION INTRAMUSCULAR; INTRAVENOUS at 17:29

## 2019-04-01 RX ADMIN — Medication 100 MG: at 17:29

## 2019-04-01 RX ADMIN — FOLIC ACID 1 MG: 1 TABLET ORAL at 17:29

## 2019-04-01 RX ADMIN — HYDRALAZINE HYDROCHLORIDE 10 MG: 20 INJECTION INTRAMUSCULAR; INTRAVENOUS at 21:53

## 2019-04-01 RX ADMIN — AMPICILLIN SODIUM AND SULBACTAM SODIUM 3 G: 2; 1 INJECTION, POWDER, FOR SOLUTION INTRAMUSCULAR; INTRAVENOUS at 12:10

## 2019-04-01 RX ADMIN — ENOXAPARIN SODIUM 40 MG: 100 INJECTION SUBCUTANEOUS at 04:59

## 2019-04-01 RX ADMIN — AMPICILLIN SODIUM AND SULBACTAM SODIUM 3 G: 2; 1 INJECTION, POWDER, FOR SOLUTION INTRAMUSCULAR; INTRAVENOUS at 04:58

## 2019-04-01 RX ADMIN — POTASSIUM PHOSPHATE, MONOBASIC AND POTASSIUM PHOSPHATE, DIBASIC 30 MMOL: 224; 236 INJECTION, SOLUTION INTRAVENOUS at 09:47

## 2019-04-01 RX ADMIN — HYDRALAZINE HYDROCHLORIDE 10 MG: 20 INJECTION INTRAMUSCULAR; INTRAVENOUS at 20:17

## 2019-04-01 RX ADMIN — AMPICILLIN SODIUM AND SULBACTAM SODIUM 3 G: 2; 1 INJECTION, POWDER, FOR SOLUTION INTRAMUSCULAR; INTRAVENOUS at 23:44

## 2019-04-01 RX ADMIN — THERA TABS 1 TABLET: TAB at 17:29

## 2019-04-01 ASSESSMENT — ENCOUNTER SYMPTOMS
MYALGIAS: 0
EYE PAIN: 0
BLURRED VISION: 0
HEMOPTYSIS: 0
DIZZINESS: 0
WEAKNESS: 1
DIAPHORESIS: 1
ABDOMINAL PAIN: 0
BRUISES/BLEEDS EASILY: 0
CHILLS: 0
NERVOUS/ANXIOUS: 1
SORE THROAT: 0
DOUBLE VISION: 0
NAUSEA: 0
HEADACHES: 0
BACK PAIN: 1
EYE DISCHARGE: 0
WHEEZING: 0
SHORTNESS OF BREATH: 0
DIARRHEA: 1
FEVER: 1
VOMITING: 0
HALLUCINATIONS: 0
BACK PAIN: 0
WEAKNESS: 0
COUGH: 1
FALLS: 1
DEPRESSION: 0
PALPITATIONS: 0
ORTHOPNEA: 0
FEVER: 0
CONSTIPATION: 0
SINUS PAIN: 0
WEIGHT LOSS: 0
CHILLS: 1
HEADACHES: 1
MEMORY LOSS: 1
SPUTUM PRODUCTION: 1
DEPRESSION: 1

## 2019-04-01 ASSESSMENT — LIFESTYLE VARIABLES: SUBSTANCE_ABUSE: 1

## 2019-04-01 NOTE — CARE PLAN
Problem: Communication  Goal: The ability to communicate needs accurately and effectively will improve  Outcome: PROGRESSING AS EXPECTED  A&O x4, calm and cooperative with care. Expressive aphasia noted, explained all care as given, verbalized understanding.     Problem: Safety  Goal: Will remain free from injury  Outcome: PROGRESSING AS EXPECTED  Resting in bed at this time, call bell in reach, able to make needs known. Bed alarm in place, bed in lowest position.

## 2019-04-01 NOTE — PROGRESS NOTES
2 Rn skin check performed with TEJA Stevens:  -Devices in place: nasal cannula  -Skin assessed under devices: intact  -Confirmed pressure ulcers found on: BLE red/swollen/dry/cracked, calloused area noted on BLE, tracy heels calloused, sacrum red and blanching with raised tissue noted, bilateral elbows red and blanching, generalized dryness, blanching redness noted on right upper thigh  -The following interventions are in place: Patient turns self, BLE floated on pillows, wound care consulted

## 2019-04-01 NOTE — PROGRESS NOTES
"Pharmacy Kinetics 68 y.o. male on vancomycin day # 2 3/31/2019    Currently on Vancomycin 1300 mg iv q12hr - stared 12 hrs post loading dose    Indication for Treatment: Empiric - R/O sepsis (cellulitis vs. PNA)    Pertinent history per medical record: Admitted on 3/30/2019 for Brought in by ambulance presenting with fever. Patient is believed to be homeless. Altered, no concerns for meningitis. Lung mass on CT scan. Appears to have a postobstructive pneumonia secondary to the right lung mass. 2 lower extremity wounds. Broad spectrum antibiotics initiated.  Transferred to ICU.    Other antibiotics: Zosyn - Unasyn 3 g IV q 6 hr (3/31/19)    Allergies: Tape     List concerns for renal function:  SIRS criteria on admission, contrast 3/30/19, age 67 yo      Pertinent cultures to date:   3/30/19: PBC x 2 - in process  3/30/19: Urine - in process  3/31/19: Wound cx x 2 in process  3/31/19:  MRSA by PCR - in process  3/30/19:  Respiratory culture ordered - not collected  3/30/19:  Influenza negative    MRSA nares swab if pneumonia is a concern: Yes    Recent Labs      19   1850  19   0345   WBC  20.1*  20.4*   NEUTSPOLYS  93.00*  92.30*     Recent Labs      19   1745  19   0345   BUN  10  12   CREATININE  0.73  0.83   ALBUMIN  3.8  3.2     No results for input(s): VANCOTROUGH, VANCOPEAK, VANCORANDOM in the last 72 hours.  Intake/Output Summary (Last 24 hours) at 19 1722  Last data filed at 19 1200   Gross per 24 hour   Intake          5740.93 ml   Output             1070 ml   Net          4670.93 ml      Blood pressure 121/73, pulse 92, temperature 36.9 °C (98.5 °F), temperature source Temporal, resp. rate 20, height 1.829 m (6' 0.01\"), weight 89 kg (196 lb 3.4 oz), SpO2 96 %. Temp (24hrs), Av.5 °C (99.5 °F), Min:36.6 °C (97.8 °F), Max:39.6 °C (103.3 °F)      A/P Indication:  Empiric - R/O sepsis (cellulitis vs. PNA)     Goal vancomycin trough:  16-20 mcg/mL    1. Vancomycin dose " change: not currently indicated  2. Next vancomycin level: 4/1/19 @ 0930 (prior to 4th total dose)  3. Comments: Continued improvement throughout night.  Evaluated BLE this AM.  R leg with diffuse cellulitis, warm to touch - very strepish in appearance.  Skin breakdown with no overt s/s pus observed.  Zosyn adjusted to Unasyn today.  Wound cx collected and in process - unsure if area cultures taken from are infected as no weeping wounds appreciated.  MRSA nares in process.  Lung mass to be worked up.  PCT = 0.91 on admission, d/w PMA will repeat 48 hrs x 2 - next tomorrow AM.  Transfer out of unit planned today.  Renal function remains stable with adequate UOP documented.  Renal labs daily x 2 ordered.  Will check trough tomorrow AM prior to 4th total dose.  If patient continues to improve would consider d/c of vancomycin therapy and monitoring with no MRSA coverage.  Pharmacy to monitor and adjust as needed.            Monserrat Lewis, PharmD, BCPS

## 2019-04-01 NOTE — WOUND TEAM
Patient assessed with RN.  BLE with 2 or more  Scattered areas of crust.  Most will flake off with moisture.  RN/CNA going to attempt to cleanse/soak feet.  Aquaphor ordered for BLE.  Right buttocks with small pink area that appears to be from moisture ~01x2x<0.5.  Appears chronic.  All other areas intact.  No other wound care needs at this time.

## 2019-04-01 NOTE — PROGRESS NOTES
Assumed care of pt from Timo Weldon. Pt has complaints of 5/10 mid back pain.   Hourly rounding in place. Call light within reach, treaded slipper socks on, bed locked in lowest position. Mobility and fall risk assessed- proper communication signs are in place. Built in bed alarm is on, will have CNA add strip alarm as well.

## 2019-04-01 NOTE — PROGRESS NOTES
A&O x4, calm and cooperative with care. Able to make needs known. Expressive aphasia noted. Explained all care as given. ble red and swollen. Pt stood at edge of bed with myself, standby assist tolerated well. Slightly unsteady. Resting in bed, bed alarm in place, call bell in reach.

## 2019-04-01 NOTE — PROGRESS NOTES
· 2 RN skin check complete with Funmi  · Devices in place: 2 PIVs  · Skin assessed under devices:N/A  · Confirmed pressure ulcers found on: BLE red hot and multiple wounds, BLE pulses +2, coccyx, buttocks, (pictures submitted, wound team consulted) L hand 4th digit, generalized skin issues d/t lack of hygiene   · The following interventions in place: LORNA mattress

## 2019-04-01 NOTE — CARE PLAN
Problem: Fluid Volume:  Goal: Will maintain balanced intake and output  Outcome: PROGRESSING AS EXPECTED  Pt is currently receiving an LR infusion at 83 mL/hr and a rally bag infusion at 42 mL/hr.    Problem: Respiratory:  Goal: Respiratory status will improve  Outcome: PROGRESSING SLOWER THAN EXPECTED  Pt is currently on 4L O2; pt's baseline is RA. Pt has a productive cough, this Rn has been unable to visualize sputum.

## 2019-04-01 NOTE — NON-PROVIDER
Internal Medicine Medical Student Admitting History and Physical  Note Author: Gunner Roque, Student    Name Nestor Strong       1950   Age/Sex 68 y.o. male   MRN [id number]   Code Status DNR/DNI       Chief Complaint:  Altered Mental Status vs Sepsis    HPI:  Mr. Strong is a 68-year-old homeless male with history of schizophrenia presenting for altered mental status.  Found mumbling, laying down, thought to be drunk.  However breathalyzer was negative. In the emergency department, patient was found to be febrile with a temp of 103.3 °F, tachycardic at 104, hypertensive /114,  tachypneic RR 34, and oxygenating in the 80s. He was drowsy and mumbling with coarse breath sounds throughout and a distended abdomen. An extensive workup was initiated, he was given empiric fluids and antibiotics (Vanc Zosyn). Labs show leukocytosis (WBC 20.1) and normal lactate. Imaging showed signs of pneumonia with an associated lung mass, possibly postobstructive pneumonia. UA and UDS were negative. Patient began to mentate more clearly and able to answer questions appropriately, denied any alcohol use, does not provide any history concerning for alcohol withdrawal. Patient developed slight hypoxia during fluid bolus, responded supplemental oxygen.  Patient currently with sepsis from possible multiple sources and possible lung malignancy, expect he will deteriorate with continued fluid resuscitation and antibiotic administration.    On eval 3/31 in ICU, pt AOx4, slurring speech, baseline per pt. Hx of CVA. States he was feeling tired and SOB so he sat down on the street and stayed there till he was found. Smokes 1PPD daily, drinks EtOH daily. DNR/DNI. States he [facility] [place] [facility]. States no work up wad done that he knows of, and nothing done in [facility] per pt.    24hrs:  - sinus tach; improved HR 90s  - WBC remains elevated (20.4)  - not passing bedside swallow; NTL per speech   -  low K/Mag  - vanco/zosyn day 2 changed to unasyn/vanco (PNA + skin)  - lactic acid neg  - Pt states he is feeling better, but seems confused on why he is in the hospital      Review of Systems   Constitutional: Positive for chills, diaphoresis, fever and malaise/fatigue.   HENT: Positive for congestion. Negative for ear pain.    Eyes: Negative for blurred vision.   Respiratory: Positive for cough and sputum production.    Cardiovascular: Negative for chest pain, palpitations and orthopnea.   Gastrointestinal: Positive for diarrhea. Negative for abdominal pain, constipation, nausea and vomiting.   Genitourinary: Negative for dysuria, frequency and urgency.   Musculoskeletal: Positive for back pain, falls and joint pain.   Skin: Positive for rash.   Neurological: Positive for weakness and headaches. Negative for dizziness.   Psychiatric/Behavioral: Positive for depression, memory loss and substance abuse. The patient is nervous/anxious.            Past Medical History (chronic problems, known complications, current management)     Past Medical History:   Diagnosis Date   • Diabetes    • Homeless    • Schizophrenia        Past Surgical History:  Past Surgical History:   Procedure Laterality Date   • MASS EXCISION GENERAL  10/1/2010    Performed by SHANNEN LENNON at SURGERY [facility] ORS   • HERNIA REPAIR         Medication Allergy/Sensitivities:  No current facility-administered medications on file prior to encounter.      No current outpatient prescriptions on file prior to encounter.       Family History:  No family history on file.    Social History:  Social History     Social History   • Marital status: Single     Spouse name: N/A   • Number of children: N/A   • Years of education: N/A     Occupational History   • Not on file.     Social History Main Topics   • Smoking status: Current Every Day Smoker   • Smokeless tobacco: Not on file   • Alcohol use Yes      Comment: occ   • Drug use: No   • Sexual activity:  "Not on file     Other Topics Concern   • Not on file     Social History Narrative   • No narrative on file   Homeless      Physical Exam     Vitals:    03/31/19 1500 03/31/19 1626 03/31/19 1910 04/01/19 0329   BP:  121/73 136/85 145/85   Pulse: 94 92 93 95   Resp: (!) 41 20 16 20   Temp:  36.9 °C (98.5 °F) 36.8 °C (98.3 °F) 36.8 °C (98.3 °F)   TempSrc:  Temporal Temporal Temporal   SpO2: 92% 96% 90% 94%   Weight:       Height:         Body mass index is 26.6 kg/m².  /85   Pulse 95   Temp 36.8 °C (98.3 °F) (Temporal)   Resp 20   Ht 1.829 m (6' 0.01\")   Wt 89 kg (196 lb 3.4 oz)   SpO2 94%   BMI 26.60 kg/m²   O2 therapy: Pulse Oximetry: 94 %, O2 (LPM): 4, O2 Delivery: Silicone Nasal Cannula    Physical Exam   Constitutional: He appears malnourished. He appears unhealthy. He has a sickly appearance. He appears distressed.   Disheveled with slurred speech   HENT:   Pt is unkempt with dry mucous membranes and a cachectic appearance    Eyes:   The patient has sunken eyes with erythematous plapebral conjunctivae and clear sclera   Pupils are constricted and react slowly to light   Cardiovascular: Regular rhythm, S1 normal, S2 normal and intact distal pulses.  Tachycardia present.  Exam reveals distant heart sounds.    No murmur heard.  Pulmonary/Chest: He has decreased breath sounds in the right middle field and the right lower field.   Abdominal: Soft. He exhibits distension. There is no hepatosplenomegaly. There is no tenderness. There is no rebound and no guarding.   Distention vs ascites; difficult to elicit a fluid wave    Musculoskeletal: Normal range of motion. He exhibits tenderness. He exhibits no edema.   Neurological: He is alert. No cranial nerve deficit.   Oriented to place (hospital) and person   Slurred speech that patient notes is baseline   Skin:   Right lower extremity erythematous with multiple bruises and lacerations; left lower extremity with chronic ulcers that do not appear to be infected "    Psychiatric:   Poor judgment; poor mood; poor memory; depressed affect       Recent Labs      03/30/19   1850  03/31/19   0345   WBC  20.1*  20.4*   RBC  5.36  5.02   HEMOGLOBIN  15.2  14.2   HEMATOCRIT  46.5  43.6   MCV  86.8  86.9   MCH  28.4  28.3   RDW  48.6  49.1   PLATELETCT  246  215   MPV  9.3  9.3   NEUTSPOLYS  93.00*  92.30*   LYMPHOCYTES  2.30*  3.70*   MONOCYTES  3.70  3.10   EOSINOPHILS  0.00  0.00   BASOPHILS  0.30  0.30     Recent Labs      03/30/19   1745  03/31/19   0345  04/01/19   0303   SODIUM  138  140  136   POTASSIUM  3.6  3.7  3.8   CHLORIDE  106  109  105   CO2  23  25  27   GLUCOSE  95  100*  94   BUN  10  12  12     Recent Labs      03/30/19 1745 03/31/19 0345  04/01/19   0303   ALBUMIN  3.8  3.2  3.1*   TBILIRUBIN  1.5  1.8*   --    ALKPHOSPHAT  80  54   --    TOTPROTEIN  6.9  5.7*   --    ALTSGPT  11  9   --    ASTSGOT  17  14   --    CREATININE  0.73  0.83  0.73     US-EXTREMITY VENOUS LOWER BILAT   Final Result      CT-CHEST,ABDOMEN,PELVIS WITH   Final Result      1.  Possible mass versus pericardial cyst noted in the right lower chest adjacent to the right heart border in the infrahilar area measuring 3.5 cm in diameter. Lung carcinoma is in the differential diagnosis.      2.  Additional area of consolidation medially in the right middle lobe is located more anteriorly in the right hemithorax. Pneumonia is a possibility.      3.  Mild mediastinal and hilar adenopathy is noted.      4.  Mild cardiomegaly.      5.  Post cholecystectomy.      6.  Small renal cyst is noted in each kidney.      7.  No acute abnormalities noted in the abdomen or pelvis.         CT-HEAD W/O   Final Result         NO ACUTE ABNORMALITIES ARE NOTED ON CT SCAN OF THE HEAD.         DX-CHEST-PORTABLE (1 VIEW)   Final Result         1.  Wedge-shaped focal opacification possibly representing consolidation is identified along the right heart border likely in the right middle pulmonary lobe. Pneumonia is a  "possibility. Consolidative atelectasis is also possible. Underlying carcinoma is    in the differential diagnosis.            Assessment/Plan   Mr. Strong is a 68-year-old homeless male with history of schizophrenia admitted following being found down with a temp of 103.3 °F, tachycardia at 104, hypertensive /114,  tachypneia RR 34, and oxygenating in the 80s. He was drowsy and mumbling with coarse breath sounds throughout and a distended abdomen. Labs show leukocytosis (WBC 20.1) and normal lactate. Imaging showed signs of pneumonia vs a lung mass, considered possibly postobstructive pneumonia. The patient was resuscitated and started on broad spectrum IV antibiotics in the ICU and transferred to the floor.     #Sepsis  #SIRS +  #Source unspecified  - SIRS: Temp 103; ; RR 34; WBC>20 (bandemia present)  - Source: skin vs pulmonary  - Temp improved on broad spectrum abx vanc/zosyn   - Procal 0.91; today 1.66  - Influenza negative  - Blood cultures negative to date  - Respiratory cultures pending  - wound cultures shows RE: mod gram + cocci and few gram + rods; LE gram + cocci  - CXR: Wedge-shaped focal opacification possibly representing consolidation is identified along the right heart border likely in the right middle pulmonary lobe.  - CT chest and abdomen: Possible mass versus pericardial cyst noted in the right lower chest adjacent to the right heart border in the infrahilar area measuring 3.5 cm in diameter. Lung carcinoma is in the differential diagnosis. Additional area of consolidation medially in the right middle lobe is located more anteriorly in the right hemithorax. Pneumonia is a possibility.  - The patient states he has a hx of lung malignancy; never been worked up  - Imaging and patient history of malignancy increase the likelihood of post-obstructive pneumonia   - Swallow eval: \" Patient demonstrated oral holding, delayed onset of swallow, decreased laryngeal elevation and hypolaryngeal " "excursion noted upon palpation. Patient demonstrated delayed coughing s/p trials of thins via side of cup. At this time recommend a Dysphagia I diet with NTL\"  - The Infectious Disease Society of [place] (IDSA) recommends:   - Patients with suspected CAP should be treated a minimum of 5 days.   - Before stopping therapy patients should, remain afebrile 48-72, breath without supplemental oxygen, and have no more than one clinical instability factor (HR>100, RR>24, or SBP<90)  Plan:  - The patient has remained hemodynamically stable over the last 24 hours,    - Still requires supplemental O2; currently resting on 4L O2   - Wean O2 as tolerated; SpO2 goal >90%  - Continue patient on IV Unasyn 3g Q6;    - The patient has reported dysphagia and is on NTL diet; may have trouble with PO meds   - If patient tolerates PO vitamins then can transition to PO Augmentin tomorrow   - Continue to trend pro-calcitonin: 0.91 on 3/30 now 1.66  - Continue IVF LR @ 83ml   - The patient still appears dehydrated with limited PO intake   - The patient has cardiomegaly on CXR so gentle hydration preferred; D/C rally bag  - Discontinue Vancomycin   - The patient's LE ulcers appear chronic secondary to venous stasis   - Wound culture grows Beta Hemolytic Streptococcus group A; does not require vancomycin coverage    #Altered Mental Status  #EtOH withdrawal vs sepsis related vs schizophrenia  - Presented with confusion; improved, but continues to struggle with memory and judgement   - The patient has a history of schizophrenia not taking any medicaiton  - Initially stated that he never drinks, but later admits to being an everyday drinker   - Continue to monitor for alcohol withdrawal;    - Thiamine 100mg TID, Folate 1mg daily, and a multivitamin    #Lung mass  - CXR: Wedge-shaped focal opacification possibly representing consolidation is identified along the right heart border likely in the right middle pulmonary lobe.  - CT chest and " abdomen: Possible mass versus pericardial cyst noted in the right lower chest adjacent to the right heart border in the infrahilar area measuring 3.5 cm in diameter. Lung carcinoma is in the differential diagnosis. Additional area of consolidation medially in the right middle lobe is located more anteriorly in the right hemithorax. Pneumonia is a possibility.  - The patient states he has a hx of lung malignancy; never been worked up  - [facility] and possible [facility] recieved    #Schizophrenia, by hx  - The patient stated on multiple occasions that there was something wrong with him and that he had a psychiatric illness  - The patient does not appear to be communicating with external stimuli or suffer from any pervasive delusions   - The patient often has tangential vs circumstantial speech  - Varying cognitive deficits  - MOCHA in the AM  - Psych consult    #Hypomagnesemia   - Secondary to chronic alcohol use  - 1.5 on 3/31  - AM mag draw to reassess   - Continue to monitor    #Hypokalemia  - Secondary to chronic alcohol use; now refeeding   - 3.6 on admit; remains stable at 3.8  - Continue to monitor    #Hypophosphatemia  - Secondary to chronic alcohol use; now refeeding   - 3.0 on 03/30 now 2.0  - Currently receiving 30meq IV   - Continue to monitor

## 2019-04-01 NOTE — CARE PLAN
Problem: Nutritional:  Goal: Achieve adequate nutritional intake  Patient will consume  50 % of meals  Outcome: PROGRESSING AS EXPECTED

## 2019-04-01 NOTE — DIETARY
"Nutrition services: Day 2 of admit.  Nestor Strong is a 68 y.o. male with admitting DX of Sepsis   HX of ETOH   Consult received for weight loss and poor po intake     Assessment:  Height: 182.9 cm (6' 0.01\")  Weight: 89 kg (196 lb 3.4 oz)  Body mass index is 26.6 kg/m².  Diet/Intake: Pureed with NTL     Evaluation:   1. Patient is homeless Schizophrenia and Diabetic     Malnutrition Risk: po intake is good while here at the hospital only 2 meals recorded   No weight loss   BMI is 26.6 which is healthy range       Recommendations/Plan:  1. Speech evaluation completed for swallow issue   2. Current diet is Pureed with NTL   One to one feeding assist   3. Encourage intake of meals and snacks   4. Document intake of all PO as % taken in ADL's to provide interdisciplinary communication across all shifts.   5. Possible wounds no wound care note in chart   6. Monitor weight.  7. Nutrition rep will continue to see patient for ongoing meal and snack preferences.           "

## 2019-04-02 ENCOUNTER — PATIENT OUTREACH (OUTPATIENT)
Dept: HEALTH INFORMATION MANAGEMENT | Facility: OTHER | Age: 69
End: 2019-04-02

## 2019-04-02 VITALS
RESPIRATION RATE: 18 BRPM | OXYGEN SATURATION: 95 % | HEART RATE: 91 BPM | WEIGHT: 196.21 LBS | SYSTOLIC BLOOD PRESSURE: 146 MMHG | DIASTOLIC BLOOD PRESSURE: 94 MMHG | HEIGHT: 72 IN | TEMPERATURE: 99.1 F | BODY MASS INDEX: 26.58 KG/M2

## 2019-04-02 PROBLEM — R00.0 TACHYCARDIA: Status: RESOLVED | Noted: 2019-03-30 | Resolved: 2019-04-02

## 2019-04-02 PROBLEM — J18.9 PNEUMONIA: Status: RESOLVED | Noted: 2019-03-30 | Resolved: 2019-04-02

## 2019-04-02 PROBLEM — R65.10 SIRS (SYSTEMIC INFLAMMATORY RESPONSE SYNDROME) (HCC): Status: RESOLVED | Noted: 2019-03-30 | Resolved: 2019-04-02

## 2019-04-02 PROBLEM — Z78.9 ALCOHOL USE: Status: RESOLVED | Noted: 2019-03-31 | Resolved: 2019-04-02

## 2019-04-02 PROBLEM — I83.009 VENOUS STASIS ULCERS (HCC): Status: RESOLVED | Noted: 2019-04-01 | Resolved: 2019-04-02

## 2019-04-02 PROBLEM — R41.82 ALTERED MENTAL STATUS: Status: RESOLVED | Noted: 2019-03-31 | Resolved: 2019-04-02

## 2019-04-02 PROBLEM — L97.909 VENOUS STASIS ULCERS (HCC): Status: RESOLVED | Noted: 2019-04-01 | Resolved: 2019-04-02

## 2019-04-02 PROBLEM — R91.8 LUNG MASS: Status: RESOLVED | Noted: 2019-03-30 | Resolved: 2019-04-02

## 2019-04-02 PROBLEM — R50.9 FEVER: Status: RESOLVED | Noted: 2019-03-30 | Resolved: 2019-04-02

## 2019-04-02 PROBLEM — C80.1 MALIGNANCY (HCC): Status: RESOLVED | Noted: 2019-03-30 | Resolved: 2019-04-02

## 2019-04-02 PROBLEM — Z59.00 HOMELESS: Status: RESOLVED | Noted: 2019-03-31 | Resolved: 2019-04-02

## 2019-04-02 PROBLEM — E83.42 HYPOMAGNESEMIA: Status: RESOLVED | Noted: 2019-03-31 | Resolved: 2019-04-02

## 2019-04-02 PROBLEM — D72.825 BANDEMIA: Status: RESOLVED | Noted: 2019-03-30 | Resolved: 2019-04-02

## 2019-04-02 PROBLEM — E87.6 HYPOKALEMIA: Status: RESOLVED | Noted: 2019-03-31 | Resolved: 2019-04-02

## 2019-04-02 PROBLEM — A41.9 SEPSIS (HCC): Status: RESOLVED | Noted: 2019-03-30 | Resolved: 2019-04-02

## 2019-04-02 LAB
ALBUMIN SERPL BCP-MCNC: 2.9 G/DL (ref 3.2–4.9)
ANION GAP SERPL CALC-SCNC: 6 MMOL/L (ref 0–11.9)
BACTERIA UR CULT: NORMAL
BACTERIA WND AEROBE CULT: ABNORMAL
BASOPHILS # BLD AUTO: 0.2 % (ref 0–1.8)
BASOPHILS # BLD: 0.02 K/UL (ref 0–0.12)
BUN SERPL-MCNC: 10 MG/DL (ref 8–22)
CALCIUM SERPL-MCNC: 8.2 MG/DL (ref 8.5–10.5)
CHLORIDE SERPL-SCNC: 104 MMOL/L (ref 96–112)
CO2 SERPL-SCNC: 27 MMOL/L (ref 20–33)
CREAT SERPL-MCNC: 0.68 MG/DL (ref 0.5–1.4)
EOSINOPHIL # BLD AUTO: 0.05 K/UL (ref 0–0.51)
EOSINOPHIL NFR BLD: 0.5 % (ref 0–6.9)
ERYTHROCYTE [DISTWIDTH] IN BLOOD BY AUTOMATED COUNT: 48.2 FL (ref 35.9–50)
GLUCOSE SERPL-MCNC: 100 MG/DL (ref 65–99)
GRAM STN SPEC: ABNORMAL
GRAM STN SPEC: ABNORMAL
HCT VFR BLD AUTO: 44.7 % (ref 42–52)
HGB BLD-MCNC: 14.3 G/DL (ref 14–18)
IMM GRANULOCYTES # BLD AUTO: 0.03 K/UL (ref 0–0.11)
IMM GRANULOCYTES NFR BLD AUTO: 0.3 % (ref 0–0.9)
LYMPHOCYTES # BLD AUTO: 0.78 K/UL (ref 1–4.8)
LYMPHOCYTES NFR BLD: 8.4 % (ref 22–41)
MCH RBC QN AUTO: 27.7 PG (ref 27–33)
MCHC RBC AUTO-ENTMCNC: 32 G/DL (ref 33.7–35.3)
MCV RBC AUTO: 86.6 FL (ref 81.4–97.8)
MONOCYTES # BLD AUTO: 0.66 K/UL (ref 0–0.85)
MONOCYTES NFR BLD AUTO: 7.1 % (ref 0–13.4)
NEUTROPHILS # BLD AUTO: 7.75 K/UL (ref 1.82–7.42)
NEUTROPHILS NFR BLD: 83.5 % (ref 44–72)
NRBC # BLD AUTO: 0 K/UL
NRBC BLD-RTO: 0 /100 WBC
PHOSPHATE SERPL-MCNC: 2.5 MG/DL (ref 2.5–4.5)
PLATELET # BLD AUTO: 244 K/UL (ref 164–446)
PMV BLD AUTO: 9.9 FL (ref 9–12.9)
POTASSIUM SERPL-SCNC: 3.8 MMOL/L (ref 3.6–5.5)
PROCALCITONIN SERPL-MCNC: 0.9 NG/ML
RBC # BLD AUTO: 5.16 M/UL (ref 4.7–6.1)
SIGNIFICANT IND 70042: ABNORMAL
SIGNIFICANT IND 70042: ABNORMAL
SIGNIFICANT IND 70042: NORMAL
SITE SITE: ABNORMAL
SITE SITE: ABNORMAL
SITE SITE: NORMAL
SODIUM SERPL-SCNC: 137 MMOL/L (ref 135–145)
SOURCE SOURCE: ABNORMAL
SOURCE SOURCE: ABNORMAL
SOURCE SOURCE: NORMAL
WBC # BLD AUTO: 9.3 K/UL (ref 4.8–10.8)

## 2019-04-02 PROCEDURE — A9270 NON-COVERED ITEM OR SERVICE: HCPCS | Performed by: STUDENT IN AN ORGANIZED HEALTH CARE EDUCATION/TRAINING PROGRAM

## 2019-04-02 PROCEDURE — 99239 HOSP IP/OBS DSCHRG MGMT >30: CPT | Mod: GC | Performed by: INTERNAL MEDICINE

## 2019-04-02 PROCEDURE — 700102 HCHG RX REV CODE 250 W/ 637 OVERRIDE(OP): Performed by: STUDENT IN AN ORGANIZED HEALTH CARE EDUCATION/TRAINING PROGRAM

## 2019-04-02 PROCEDURE — 36415 COLL VENOUS BLD VENIPUNCTURE: CPT

## 2019-04-02 PROCEDURE — 700111 HCHG RX REV CODE 636 W/ 250 OVERRIDE (IP): Performed by: STUDENT IN AN ORGANIZED HEALTH CARE EDUCATION/TRAINING PROGRAM

## 2019-04-02 PROCEDURE — 700105 HCHG RX REV CODE 258: Performed by: INTERNAL MEDICINE

## 2019-04-02 PROCEDURE — 700111 HCHG RX REV CODE 636 W/ 250 OVERRIDE (IP): Performed by: INTERNAL MEDICINE

## 2019-04-02 PROCEDURE — 80069 RENAL FUNCTION PANEL: CPT

## 2019-04-02 PROCEDURE — 84145 PROCALCITONIN (PCT): CPT

## 2019-04-02 PROCEDURE — 85025 COMPLETE CBC W/AUTO DIFF WBC: CPT

## 2019-04-02 RX ORDER — NICOTINE 21 MG/24HR
21 PATCH, TRANSDERMAL 24 HOURS TRANSDERMAL
Status: DISCONTINUED | OUTPATIENT
Start: 2019-04-03 | End: 2019-04-02 | Stop reason: HOSPADM

## 2019-04-02 RX ORDER — AMOXICILLIN AND CLAVULANATE POTASSIUM 500; 125 MG/1; MG/1
1 TABLET, FILM COATED ORAL EVERY 8 HOURS
Status: DISCONTINUED | OUTPATIENT
Start: 2019-04-02 | End: 2019-04-02 | Stop reason: HOSPADM

## 2019-04-02 RX ORDER — AMOXICILLIN AND CLAVULANATE POTASSIUM 500; 125 MG/1; MG/1
1 TABLET, FILM COATED ORAL EVERY 8 HOURS
Qty: 6 TAB | Refills: 0 | Status: SHIPPED | OUTPATIENT
Start: 2019-04-02 | End: 2019-04-04

## 2019-04-02 RX ADMIN — NICOTINE 14 MG: 14 PATCH, EXTENDED RELEASE TRANSDERMAL at 05:36

## 2019-04-02 RX ADMIN — AMOXICILLIN AND CLAVULANATE POTASSIUM 1 TABLET: 500; 125 TABLET, FILM COATED ORAL at 13:31

## 2019-04-02 RX ADMIN — AMPICILLIN SODIUM AND SULBACTAM SODIUM 3 G: 2; 1 INJECTION, POWDER, FOR SOLUTION INTRAMUSCULAR; INTRAVENOUS at 05:36

## 2019-04-02 RX ADMIN — AMPICILLIN SODIUM AND SULBACTAM SODIUM 3 G: 2; 1 INJECTION, POWDER, FOR SOLUTION INTRAMUSCULAR; INTRAVENOUS at 11:48

## 2019-04-02 ASSESSMENT — ENCOUNTER SYMPTOMS
VOMITING: 0
HEMOPTYSIS: 0
HEADACHES: 0
BLURRED VISION: 0
ORTHOPNEA: 0
HEARTBURN: 0
NAUSEA: 0
PALPITATIONS: 0
DOUBLE VISION: 0
EYE PAIN: 0
SORE THROAT: 0
SPUTUM PRODUCTION: 1
PHOTOPHOBIA: 0
DIARRHEA: 0
WEAKNESS: 0
COUGH: 1
TREMORS: 0
BACK PAIN: 1
FOCAL WEAKNESS: 0
CONSTIPATION: 0
CHILLS: 0
FEVER: 0
ABDOMINAL PAIN: 1
BRUISES/BLEEDS EASILY: 0
DIAPHORESIS: 0
SHORTNESS OF BREATH: 0

## 2019-04-02 ASSESSMENT — PATIENT HEALTH QUESTIONNAIRE - PHQ9
1. LITTLE INTEREST OR PLEASURE IN DOING THINGS: NOT AT ALL
2. FEELING DOWN, DEPRESSED, IRRITABLE, OR HOPELESS: NOT AT ALL
SUM OF ALL RESPONSES TO PHQ9 QUESTIONS 1 AND 2: 0

## 2019-04-02 ASSESSMENT — LIFESTYLE VARIABLES: SUBSTANCE_ABUSE: 1

## 2019-04-02 NOTE — NON-PROVIDER
Internal Medicine Medical Student Note  Note Author: Gunner Roque, Student    Name Nestor Strong       1950   Age/Sex 68 y.o. male   MRN [id number]   Code Status DNI/DNR       Reason for interval visit  (Principal Problem)   Mr. Strong is a 68-year-old homeless male with history of schizophrenia admitted following being found down with a temp of 103.3 °F, tachycardia at 104, hypertensive /114,  tachypneia RR 34, and oxygenating in the 80s. He was drowsy and mumbling with coarse breath sounds throughout and a distended abdomen. Labs show leukocytosis (WBC 20.1) and normal lactate. Imaging showed signs of pneumonia vs a lung mass, considered possibly postobstructive pneumonia. The patient was resuscitated and started on broad spectrum IV antibiotics in the ICU and transferred to the floor.     Interval Problem Daily Status Update  (problem status, last 24 hours, new history, new data )   - The patient is feeling anxious to leave so that he can go smoke.   - The patient requested multiple times for paperwork that would allow him to leave so he can go smoke; did not seems to understand that would mean he would lose his hospital bed  - The patient has tolerated liquids and taking PO meds  - Despite the chart stating he requires up to 4L O2 to sat >90% he was resting comfortably with his oxygen off.     Review of Systems   Constitutional: Negative for chills, diaphoresis and fever.   HENT: Negative for congestion, ear pain, hearing loss and sore throat.    Eyes: Negative for blurred vision, double vision, photophobia and pain.   Respiratory: Positive for cough and sputum production. Negative for hemoptysis and shortness of breath.    Cardiovascular: Negative for chest pain, palpitations, orthopnea and leg swelling.   Gastrointestinal: Positive for abdominal pain. Negative for constipation, diarrhea, heartburn, nausea and vomiting.   Genitourinary: Negative for dysuria, frequency and urgency.    Musculoskeletal: Positive for back pain.   Skin: Negative for rash.   Neurological: Negative for tremors, focal weakness, weakness and headaches.   Endo/Heme/Allergies: Does not bruise/bleed easily.   Psychiatric/Behavioral: Positive for substance abuse.         Physical Exam       Vitals:    04/02/19 0005 04/02/19 0315 04/02/19 0710 04/02/19 1230   BP: 159/97 153/88 (!) 175/92 146/94   Pulse: 100 97 74 91   Resp: 18 18 18 18   Temp:  37.6 °C (99.6 °F) 37.3 °C (99.2 °F) 37.3 °C (99.1 °F)   TempSrc:  Temporal Temporal Temporal   SpO2: 93% 91% 95% 95%   Weight:       Height:         Body mass index is 26.6 kg/m².    Oxygen Therapy:  Pulse Oximetry: 95 %, O2 (LPM): 4, O2 Delivery: Silicone Nasal Cannula    Physical Exam   Constitutional: He is oriented to person, place, and time. Vital signs are normal. He appears malnourished. He appears to not be writhing in pain and not dehydrated. He appears unhealthy. He does not have a sickly appearance. No distress.   HENT:   Pt is unkempt/dishelved with moist mucous membranes    Eyes:   The patient has sunken eyes with erythematous plapebral conjunctivae and clear sclera    Neck: Normal range of motion.   Cardiovascular: Regular rhythm, normal heart sounds and intact distal pulses.  Tachycardia present.    No murmur heard.  Pulmonary/Chest: No accessory muscle usage. No respiratory distress. He has decreased breath sounds in the right lower field and the left lower field.   Abdominal: Soft. Bowel sounds are normal. There is no hepatosplenomegaly. There is no tenderness. There is no rebound. A hernia is present.   Musculoskeletal: He exhibits tenderness.   Neurological: He is oriented to person, place, and time. No cranial nerve deficit.   Slurred speech present; pt states this is baseline   Skin:   Right lower extremity erythematous with multiple bruises and lacerations; left lower extremity with chronic ulcers that do not appear to be infected     Psychiatric:   Poor  judgement; poor memory; anxious mood         Assessment/Plan   #Sepsis   #Source unspecified  Mr. Strong is a 68-year-old homeless male with history of schizophrenia admitted following being found down with a temp of 103.3 °F, tachycardia at 104, hypertensive /114,  tachypneia RR 34, and oxygenating in the 80s. He was drowsy and mumbling with coarse breath sounds throughout and a distended abdomen. Labs show leukocytosis (WBC 20.1) and normal lactate. The patient has clinically improved following administration of broad spectrum IV antibiotics with IVF. The patient is now hemodynamically stable on PO augmenting 500mg Q8, and tolerating his diet appropriately.   Plan:  - The patient has remained hemodynamically stable over the last 24 hours:               - No longer requires supplemental O2  - Switching IV Unasyn 3g Q6 to PO augmentin 500mg Q8               - The patient has reported no further dysphagia and is tolerating PO diet    #Lung mass  Recent CXR showed: Wedge-shaped focal opacification possibly representing consolidation is identified along the right heart border likely in the right middle pulmonary lobe. CT chest and abdomen revealed: Possible mass versus pericardial cyst noted in the right lower chest adjacent to the right heart border in the infrahilar area measuring 3.5 cm in diameter. Lung carcinoma is in the differential diagnosis. Additional area of consolidation medially in the right middle lobe is located more anteriorly in the right hemithorax. The patient states he has a hx of lung malignancy; never been worked up. The patient seems unconcerned to work this up at this time.   Plan:  - [facility] and possible [facility] received     #Altered Mental Status  #EtOH withdrawal vs sepsis related vs schizophrenia  The patient presented with confusion; now improved, but continues to struggle with memory and judgement. He has a history of schizophrenia and is  not taking any medications. On admission he stated that he never drinks, but later admited to being an everyday drinker.  Plan:  - Continue to monitor for alcohol withdrawal; the patient is outside of the window and current symptoms are not suggestive of withdrawal  - Continue thiamine 100mg TID, Folate 1mg daily, and a multivitamin     #Schizophrenia, by hx  The patient stated on multiple occasions that there was something wrong with him and that he had a psychiatric illness. He continues to perseverate on his multiple issues and how they are helped by smoking. The patient appeared more agitated today, but remains non-violent. He continues to use tangential and circumferential speech. The patient states he is able to take care of himself on discharge, but admits to being homeless with limited resources.   Plan:  - Psych consult     #Electrolyte changes   - Hypomagnesemia - 1.5 on 3/31; not reassessed since admission  - Hypokalemia - stable   - Hypophosphatemia - 3.0 on 03/30 now 2.5 - stable  Plan:  - Continue to monitor with repeat BMP + mag in am  - Pt is taking a daily multivitamin  - Replete as needed

## 2019-04-02 NOTE — CARE PLAN
Problem: Communication  Goal: The ability to communicate needs accurately and effectively will improve  Outcome: PROGRESSING AS EXPECTED  A&OX4, calm and cooperative with care. Able to make needs known. Expressive aphasia noted. Goes off on tangents at times. Explained all care as given, verbalized understanding.     Problem: Safety  Goal: Will remain free from injury  Outcome: PROGRESSING AS EXPECTED  Resting in bed at this time, bed alarm in place, bed in lowest position. Call bell in reach, frequent checks provided.

## 2019-04-02 NOTE — PROGRESS NOTES
A&O x4, calm and cooperative with care. Impulsivity noted. Explained all care as given, verbalized understanding. BLE red and swollen with multiple scabs noted. Pt ambulates to BR with standby assist, tolerates well. Bed alarm in place. Frequent checks provided.

## 2019-04-02 NOTE — DISCHARGE PLANNING
Care Transition Team Assessment    While patient is orientated by 4, due to his untreated mental health. Patient is difficult to keep focused, needs quite a bit of redirection.  Patient will cry when responding to questions.  Making statements that [facility] and [facility]  Patient then will say the his is important to the government and that is why he is on a legal commitment.  Patient is homeless, will use his SS funds to pay for a motel and food. Or he will just sleep where he thinks it will be warm, janitors closet in [place].  Patient is wanting to leave AMA    [facility] to [facility], [phone], patient has a , Becca Garsia. SW told that the [facility] has helped patient into a group home, but he left shortly after he was placed. Patient will stop by the [facility]reach Program quite a bit, to get a copy of his ID so he can access his funds.   is not aware of any legal issues patient may have and is not on a legal commitment with the [facility].  They validated the the patient has untreated mental health needs.  SW provided room number to them, also patient is wanting to leave AMA.    Information Source  Orientation : Oriented x 4  Information Given By: Patient  Informant's Name: Nestor Louisopecatalina Risk  Legal Hold: No  Ambulatory or Self Mobile in Wheelchair: Yes  Disoriented: No  Psychiatric Symptoms: Impulsivity-at Risk for Elopement  History of Wandering: No  Elopement this Admit: No  Vocalizing Wanting to Leave: No  Displays Behaviors, Body Language Wanting to Leave: No-Not at Risk for Elopement  Elopement Risk: Not at Risk for Elopement    Interdisciplinary Discharge Planning  Does Admitting Nurse Feel This Could be a Complex Discharge?: Yes  Lives with - Patient's Self Care Capacity: Unable To Determine At This Time, Other (Comments)  Patient or legal guardian wants to designate a caregiver (see row info): No  Support Systems: Other (Comments)  "(community outreach)  Housing / Facility: Homeless  Able to Return to Previous ADL's: Yes  Mobility Issues: No  Assistance Needed: Yes  Durable Medical Equipment: Not Applicable    Discharge Preparedness  What is your plan after discharge?: Other (comment) (wants to leave AMA, Homeless)  What are your discharge supports?: Other (comment) (patient states he does not have friends)  Prior Functional Level: Ambulatory  Difficulity with ADLs: None  Difficulity with IADLs: Cooking, Driving, Laundry, Managing medication  Difficulity with IADL Comments:  (untreated mental health )    Functional Assesment  Prior Functional Level: Ambulatory    Finances  Financial Barriers to Discharge: No (SS of about $1,000)  Prescription Coverage: Yes    Vision / Hearing Impairment  Right Eye Vision: Impaired, Patient Declines to Wear Visual Aid  Left Eye Vision: Impaired, Patient Declines to Wear Visual Aid         Advance Directive  Advance Directive?: None  Advance Directive offered?: AD Booklet refused    Domestic Abuse  Have you ever been the victim of abuse or violence?: No  Physical Abuse or Sexual Abuse: No  Verbal Abuse or Emotional Abuse: No  Possible Abuse Reported to::     Psychological Assessment  History of Substance Abuse: Alcohol  Date Last Used - Alcohol:  (thinks day he was admitted)  Substance Abuse Comments:  (\"I like my beer\")  History of Psychiatric Problems: Yes  Non-compliant with Treatment: Yes  Newly Diagnosed Illness: No    Discharge Risks or Barriers  Discharge risks or barriers?: No PCP, Substance abuse, Mental health, Lives alone, no community support, Non-adherence to medication or treatment, Homeless / couch surfing  Patient risk factors: Homeless, Lack of outside supports, Lives alone and no community support, Substance abuse    Anticipated Discharge Information  Anticipated discharge disposition: Shelter  Discharge Address:  ([address] [place] [address] Street)        "

## 2019-04-02 NOTE — DISCHARGE SUMMARY
[facility] Internal Medicine Discharge Summary      Admit Date:  3/30/2019       Discharge Date:   04/02/19      Service:   [facility] Internal [facility] Hany/Green Team  Attending Physician(s):   Pelon       Senior Resident(s):   Jayesh Gonzalez  Lew Resident(s):   Sebastien      Primary Diagnosis:   Acute hypoxic respiratory failure due to community acquired pneumonia    Secondary Diagnoses:                Active Hospital Problems    Diagnosis   • Hypertension [I10]   • Tobacco abuse [Z72.0]     Hospital Summary (Brief Narrative):       Mr Strong is a 68 year old male who was admitted to [facility] on 3/30/19 after he was found by pedestrians behind the [employer] hotel in [place]. He was septic on admission with high fevers. CT scan of the head was unremarkable. CT scan of the chest, abdomen and pelvis with contrast revealed mass vs pericardial cyst in the right chest with an area of consolidation in the right middle lobe with mediastinal and hilar adenopathy. He was treated with IV fluids per sepsis guidelines and started on vancomycin and zosyn. The patient improved and was transferred to the medical floor. Antibiotics were changed to Unasyn and eventually Augmentin to complete a five day course. Cultures remained negative. The patient completed an ambulatory oxygen evaluation prior to discharge. Oxygen saturation were adequate given underlying COPD and he was not provided oxygen on discharge as he continues to smoke and is homeless and it was felt the risk of oxygen therapy outweighed any benefit. He will follow up with Dr. Small at [facility] in two weeks for repeat CXR and ongoing management of the mass identified on CT. Of note a bronchoscopic evaluation was offered prior to discharge; however the patient politely declined to have this completed stating that he would [facility] and was not interested in seeking diagnosis or treatment. The patient had capacity and  expressed understanding of his illness.     Consultants:     Pulmonolocy    Procedures:        Non    Imaging/ Testing:      US-EXTREMITY VENOUS LOWER BILAT   Final Result      CT-CHEST,ABDOMEN,PELVIS WITH   Final Result      1.  Possible mass versus pericardial cyst noted in the right lower chest adjacent to the right heart border in the infrahilar area measuring 3.5 cm in diameter. Lung carcinoma is in the differential diagnosis.      2.  Additional area of consolidation medially in the right middle lobe is located more anteriorly in the right hemithorax. Pneumonia is a possibility.      3.  Mild mediastinal and hilar adenopathy is noted.      4.  Mild cardiomegaly.      5.  Post cholecystectomy.      6.  Small renal cyst is noted in each kidney.      7.  No acute abnormalities noted in the abdomen or pelvis.         CT-HEAD W/O   Final Result         NO ACUTE ABNORMALITIES ARE NOTED ON CT SCAN OF THE HEAD.         DX-CHEST-PORTABLE (1 VIEW)   Final Result         1.  Wedge-shaped focal opacification possibly representing consolidation is identified along the right heart border likely in the right middle pulmonary lobe. Pneumonia is a possibility. Consolidative atelectasis is also possible. Underlying carcinoma is    in the differential diagnosis.            Discharge Medications:         Medication Reconciliation: Completed     Medication List      START taking these medications      Instructions   amoxicillin-clavulanate 500-125 MG Tabs  Commonly known as:  AUGMENTIN   Take 1 Tab by mouth every 8 hours for 2 days.  Dose:  1 Tab            Disposition:   Home    Diet:   Healthy    Activity:   As tolerated    Instructions:      Complete Augmentin. Seek mdical attention if you experience more than four lose stools per day.    The patient was instructed to return to the ER in the event of worsening symptoms. I have counseled the patient on the importance of compliance and the patient has agreed to proceed with all  "medical recommendations and follow up plan indicated above.   The patient understands that all medications come with benefits and risks. Risks may include permanent injury or death and these risks can be minimized with close reassessment and monitoring.        Primary Care Provider:      Discharge summary faxed to primary care provider:  Completed  Copy of discharge summary given to the patient: Completed    Follow up appointment details :      [facility] in two weeks    Pending Studies:        None    Time spent on discharge day patient visit, preparing discharge paperwork and arranging for patient follow up.    Discharge Time (Minutes) :    > 60 minutes      Condition on Discharge    ______________________________________________________________________    Interval history/exam for day of discharge:    Eating breakfast. Eager to discharge home. \"Breathing well\". Denies chest pain.     Vitals:    04/02/19 0005 04/02/19 0315 04/02/19 0710 04/02/19 1230   BP: 159/97 153/88 (!) 175/92 146/94   Pulse: 100 97 74 91   Resp: 18 18 18 18   Temp:  37.6 °C (99.6 °F) 37.3 °C (99.2 °F) 37.3 °C (99.1 °F)   TempSrc:  Temporal Temporal Temporal   SpO2: 93% 91% 95% 95%   Weight:       Height:         Weight/BMI: Body mass index is 26.6 kg/m².  Pulse Oximetry: 95 %, O2 (LPM): 4, O2 Delivery: Silicone Nasal Cannula    General: No acute distress. Eating lunch. Lip smacking.   CVS: RRR no m/g/r  PULM: Distant lung sounds. No crackles  Abdomen is soft.     Most Recent Labs:    Lab Results   Component Value Date/Time    WBC 9.3 04/02/2019 01:43 AM    RBC 5.16 04/02/2019 01:43 AM    HEMOGLOBIN 14.3 04/02/2019 01:43 AM    HEMATOCRIT 44.7 04/02/2019 01:43 AM    MCV 86.6 04/02/2019 01:43 AM    MCH 27.7 04/02/2019 01:43 AM    MCHC 32.0 (L) 04/02/2019 01:43 AM    MPV 9.9 04/02/2019 01:43 AM    NEUTSPOLYS 83.50 (H) 04/02/2019 01:43 AM    LYMPHOCYTES 8.40 (L) 04/02/2019 01:43 AM    MONOCYTES 7.10 04/02/2019 " 01:43 AM    EOSINOPHILS 0.50 04/02/2019 01:43 AM    BASOPHILS 0.20 04/02/2019 01:43 AM    HYPOCHROMIA 1+ 10/11/2010 06:37 PM      Lab Results   Component Value Date/Time    SODIUM 137 04/02/2019 01:43 AM    POTASSIUM 3.8 04/02/2019 01:43 AM    CHLORIDE 104 04/02/2019 01:43 AM    CO2 27 04/02/2019 01:43 AM    GLUCOSE 100 (H) 04/02/2019 01:43 AM    BUN 10 04/02/2019 01:43 AM    CREATININE 0.68 04/02/2019 01:43 AM      Lab Results   Component Value Date/Time    ALTSGPT 9 03/31/2019 03:45 AM    ASTSGOT 14 03/31/2019 03:45 AM    ALKPHOSPHAT 54 03/31/2019 03:45 AM    TBILIRUBIN 1.8 (H) 03/31/2019 03:45 AM    ALBUMIN 2.9 (L) 04/02/2019 01:43 AM    GLOBULIN 2.5 03/31/2019 03:45 AM    INR 1.23 (H) 03/30/2019 08:36 PM     Lab Results   Component Value Date/Time    PROTHROMBTM 15.6 (H) 03/30/2019 08:36 PM    INR 1.23 (H) 03/30/2019 08:36 PM

## 2019-04-02 NOTE — PROGRESS NOTES
"Pt. Refused skin check for tonight. Educated on the risk and benefits of qshift checking pt. Still refused. Stated \"get out, I don't want to do it.\"  "

## 2019-04-02 NOTE — CARE PLAN
Problem: Safety  Goal: Will remain free from falls  Outcome: PROGRESSING AS EXPECTED  Fall prec in place, bed alarm is on. Call light placed within reach.     Problem: Venous Thromboembolism (VTW)/Deep Vein Thrombosis (DVT) Prevention:  Goal: Patient will participate in Venous Thrombosis (VTE)/Deep Vein Thrombosis (DVT)Prevention Measures  Outcome: PROGRESSING AS EXPECTED   04/01/19 2030   Mechanical/VTE Prophylaxis   Mechanical Prophylaxis  SCDs, Sequential Compression Device   SCDs, Sequential Compression Device Refused   OTHER   Risk Assessment Score 1   VTE RISK Moderate   Pharmacologic Prophylaxis Used LMWH: Enoxaparin(Lovenox)       Problem: Respiratory:  Goal: Respiratory status will improve  Outcome: PROGRESSING AS EXPECTED  Pt. Currently on Oxygen, maintained sats above 90% and tolerating well.

## 2019-04-02 NOTE — PROGRESS NOTES
Internal Medicine Interval Note  Note Author: Joe Mnotana M.D.     Name Nestor Strong 1950   Age/Sex 68 y.o. male   MRN [id number]   Code Status DNAR/DNI     After 5PM or if no immediate response to page, please call for cross-coverage  Attending/Team: Dr. Bird / Vinod  See Patient List for primary contact information  Call [phone] to page    1st Call - Day Intern (R1):   Dr. Montana  2nd Call - Day Sr. Resident (R2/R3):   Dr. Small          Reason for interval visit  (Principal Problem)   Pneumonia with lung mass on chest x-ray suspicious for malignancy      Interval Problem Daily Status Update  (24 hours, problem oriented, brief subjective history, new lab/imaging data pertinent to that problem)     - Patient endorses cough productive of whitish colored sputum, however denies fever, chills or chest pain; has rhonchi in the right lung feels upon examination  - Leg wounds most likely secondary to venous stasis, discontinued vancomycin.  - Lactic acid levels normal at 1.1, however, White cell count continues to be elevated at 20.4 with a left shift  - Patient is requiring 4 L of oxygen to maintain sats at 94%; patient is receiving LR at 83 mL/h, will reassess fluid status and requirements tomorrow  - Wound cultures positive for gram-positive cocci, blood cultures still pending  -Patient has slurred speech.  SLP assessed  the patient today and recommended NTL and 1:1 for feeding  -Seems like patient has disorganized thought content, has a previous diagnosis of schizophrenia; [facility] with [facility] if available     Review of Systems   Constitutional: Negative for chills and fever.   HENT: Negative for sinus pain and sore throat.    Eyes: Negative for double vision, pain and discharge.   Respiratory: Positive for cough and sputum production. Negative for shortness of breath and wheezing.    Cardiovascular: Negative for chest pain and palpitations.    Gastrointestinal: Negative for abdominal pain, nausea and vomiting.   Genitourinary: Negative for dysuria.   Skin: Negative for itching and rash.   Neurological: Negative for dizziness, weakness and headaches.       Disposition/Barriers to discharge:   Patient is homeless    Consultants/Specialty  None    PCP: Pcp Pt States None      Quality Measures  Quality-Core Measures   Reviewed items::  EKG reviewed, Labs reviewed, Medications reviewed and Radiology images reviewed  Hines catheter::  No Hines  DVT prophylaxis pharmacological::  Enoxaparin (Lovenox)  Ulcer Prophylaxis::  No  Antibiotics:  Treating active infection/contamination beyond 24 hours perioperative coverage          Physical Exam       Vitals:    03/31/19 1910 04/01/19 0329 04/01/19 0854 04/01/19 1519   BP: 136/85 145/85 157/93 (!) 169/96   Pulse: 93 95 95 100   Resp: 16 20 19 19   Temp: 36.8 °C (98.3 °F) 36.8 °C (98.3 °F) 36.1 °C (96.9 °F) 37 °C (98.6 °F)   TempSrc: Temporal Temporal Temporal Temporal   SpO2: 90% 94% 93% 97%   Weight:       Height:         Body mass index is 26.6 kg/m².    Oxygen Therapy:  Pulse Oximetry: 97 %, O2 (LPM): 4, O2 Delivery: Silicone Nasal Cannula    Physical Exam  Constitutional: He is oriented to person, place, and time. No distress.   Unkempt, disheveled    HENT:   Mouth/Throat: Mucous membranes are dry. No oropharyngeal exudate.   Abrasion on scalp   Eyes: Pupils are equal, round, and reactive to light. Conjunctivae and EOM are normal. No scleral icterus.   Neck: Neck supple. No JVD present.   Cardiovascular: Regular rhythm and normal heart sounds.  Tachycardia present.    No murmur heard.  Pulmonary/Chest: Effort normal. No respiratory distress. He has no wheezes. He has no rales.   Abdominal: Soft. Bowel sounds are normal. He exhibits no distension. There is no tenderness. There is no rebound and no guarding.   Musculoskeletal:   RLE erythema, hot, edema. LLE chronic ulcer, erythema, not hot. Multiple abrasion  bilaterally. Onychomycosis    Lymphadenopathy:     He has no cervical adenopathy.   Neurological: He is alert and oriented to person, place, and time. No cranial nerve deficit.   Slurred speech    Skin: Skin is warm and dry. He is not diaphoretic.   Multiple abrasions on extremities       Assessment/Plan     * Sepsis (HCC)- (present on admission)   Assessment & Plan    This is sepsis (without associated acute organ dysfunction).    Source cellulitis vs post-obstructive pneumonia or component of both  Procal 0.91  Influenza negative   IV fluids  Blood cultures 3/31 NGTD  Wound cultures 4/1/gram-positive cocci  Respiratory cultures pending     Plan:  -Discontinued vancomycin as the foot wounds seem unlikely to be infected  -Continue Unasyn  -Continue IV fluids LR at 83 cc/h       Venous stasis ulcers (HCC)- (present on admission)   Assessment & Plan    Bilateral leg wounds, right worse than left  Did not look infected, mild erythema, no warmth, pus or drainage    Discontinued vancomycin  Keep legs elevated     Altered mental status- (present on admission)   Assessment & Plan    Resolved, UDS and EtOH negative   Possible due to sepsis     Pneumonia- (present on admission)   Assessment & Plan    Patient appears to have a postobstructive pneumonia secondary to the right lung mass noted on CT  Procal 0.91  Blood cultures and sputum cultures pending  Discontinued vancomycin, currently only on Unasyn     Alcohol use- (present on admission)   Assessment & Plan    States daily use, EtOH 0 on admission   Monitor for withdrawl     Hypertension- (present on admission)   Assessment & Plan    PRN meds. Has hx of but takes no meds     Fever- (present on admission)   Assessment & Plan    Secondary to infection, lung vs skin  Tylenol        Lung mass- (present on admission)   Assessment & Plan    Presumed lung mass noted on CT on admission  States he [facility] by [facility] [place] that [facility] had lung mass but nothing was done per pt    Will  proc[facility] the [facility]     Bandemia- (present on admission)   Assessment & Plan    Please refer to cellulitis and sepsis assessment and plan     SIRS (systemic inflammatory response syndrome) (HCC)- (present on admission)   Assessment & Plan    Please refer to sepsis assessment and plan     Malignancy (HCC)- (present on admission)   Assessment & Plan    Please refer to lung mass assessment and plan     Homeless- (present on admission)   Assessment & Plan    Social service consult      Hypomagnesemia- (present on admission)   Assessment & Plan    Replete as needed     Hypokalemia- (present on admission)   Assessment & Plan    Replete as needed     Tobacco abuse- (present on admission)   Assessment & Plan    Nicotine patches, counseled      Tachycardia- (present on admission)   Assessment & Plan    Improving following IV fluid resuscitation

## 2019-04-02 NOTE — PROGRESS NOTES
Received pt. In bed awake, alert and orientedx4, denies any complaint of pain/discomfort at the time of assessment. Pt. Able to make needs known, continent and can be incontinent at times. PIV noted on L FA intact. BLE noted to be red and warm with multiple scabs and some trace swelling. Fall prec in place. Call light placed within reach, due meds given, needs attended.

## 2019-04-02 NOTE — PROGRESS NOTES
Spoke to Dr. Hernández, updated regarding BP of pt. Received order to stop fluids and reassess VS in 1hr.

## 2019-04-02 NOTE — DISCHARGE INSTRUCTIONS
Discharge Instructions    Discharged to other by taxi with self. Discharged via walking, hospital escort: Refused.  Special equipment needed: Not Applicable    Be sure to schedule a follow-up appointment with your primary care doctor or any specialists as instructed.     Discharge Plan:   Smoking Cessation Offered: Patient Refused  Pneumococcal Vaccine Administered/Refused: Given (See MAR)  Influenza Vaccine Indication: Indicated: 65 years and older  Influenza Vaccine Given - only chart on this line when given: Influenza Vaccine Given (See MAR)    I understand that a diet low in cholesterol, fat, and sodium is recommended for good health. Unless I have been given specific instructions below for another diet, I accept this instruction as my diet prescription.   Other diet: regular    Special Instructions: None    · Is patient discharged on Warfarin / Coumadin?   No     Depression / Suicide Risk    As you are discharged from this [facility] facility, it is important to learn how to keep safe from harming yourself.    Recognize the warning signs:  · Abrupt changes in personality, positive or negative- including increase in energy   · Giving away possessions  · Change in eating patterns- significant weight changes-  positive or negative  · Change in sleeping patterns- unable to sleep or sleeping all the time   · Unwillingness or inability to communicate  · Depression  · Unusual sadness, discouragement and loneliness  · Talk of wanting to die  · Neglect of personal appearance   · Rebelliousness- reckless behavior  · Withdrawal from people/activities they love  · Confusion- inability to concentrate     If you or a loved one observes any of these behaviors or has concerns about self-harm, here's what you can do:  · Talk about it- your feelings and reasons for harming yourself  · Remove any means that you might use to hurt yourself (examples: pills, rope, extension cords, firearm)  · Get professional help from the  community (Mental Health, Substance Abuse, psychological counseling)  · Do not be alone:Call your Safe Contact- someone whom you trust who will be there for you.  · Call your local CRISIS HOTLINE 663-8664 or 296-409-2891  · Call your local Children's Mobile Crisis Response Team [place] (572) 323-6539 or www.Snapfish  · Call the toll free National Suicide Prevention Hotlines   · National Suicide Prevention Lifeline 906-099-DCUD (5606)  · National Hope Line Network 800-SUICIDE (152-5558)  Amoxicillin capsules or tablets  What is this medicine?  AMOXICILLIN (a mox i NADIR in) is a penicillin antibiotic. It is used to treat certain kinds of bacterial infections. It will not work for colds, flu, or other viral infections.  This medicine may be used for other purposes; ask your health care provider or pharmacist if you have questions.  COMMON BRAND NAME(S): Amoxil, Moxilin, Sumox, Trimox  What should I tell my health care provider before I take this medicine?  They need to know if you have any of these conditions:  -asthma  -kidney disease  -an unusual or allergic reaction to amoxicillin, other penicillins, cephalosporin antibiotics, other medicines, foods, dyes, or preservatives  -pregnant or trying to get pregnant  -breast-feeding  How should I use this medicine?  Take this medicine by mouth with a glass of water. Follow the directions on your prescription label. You may take this medicine with food or on an empty stomach. Take your medicine at regular intervals. Do not take your medicine more often than directed. Take all of your medicine as directed even if you think your are better. Do not skip doses or stop your medicine early.  Talk to your pediatrician regarding the use of this medicine in children. While this drug may be prescribed for selected conditions, precautions do apply.  Overdosage: If you think you have taken too much of this medicine contact a poison control center or emergency room at  once.  NOTE: This medicine is only for you. Do not share this medicine with others.  What if I miss a dose?  If you miss a dose, take it as soon as you can. If it is almost time for your next dose, take only that dose. Do not take double or extra doses.  What may interact with this medicine?  -amiloride  -birth control pills  -chloramphenicol  -macrolides  -probenecid  -sulfonamides  -tetracyclines  This list may not describe all possible interactions. Give your health care provider a list of all the medicines, herbs, non-prescription drugs, or dietary supplements you use. Also tell them if you smoke, drink alcohol, or use illegal drugs. Some items may interact with your medicine.  What should I watch for while using this medicine?  Tell your doctor or health care professional if your symptoms do not improve in 2 or 3 days. Take all of the doses of your medicine as directed. Do not skip doses or stop your medicine early.  If you are diabetic, you may get a false positive result for sugar in your urine with certain brands of urine tests. Check with your doctor.  Do not treat diarrhea with over-the-counter products. Contact your doctor if you have diarrhea that lasts more than 2 days or if the diarrhea is severe and watery.  What side effects may I notice from receiving this medicine?  Side effects that you should report to your doctor or health care professional as soon as possible:  -allergic reactions like skin rash, itching or hives, swelling of the face, lips, or tongue  -breathing problems  -dark urine  -redness, blistering, peeling or loosening of the skin, including inside the mouth  -seizures  -severe or watery diarrhea  -trouble passing urine or change in the amount of urine  -unusual bleeding or bruising  -unusually weak or tired  -yellowing of the eyes or skin  Side effects that usually do not require medical attention (report to your doctor or health care professional if they continue or are  bothersome):  -dizziness  -headache  -stomach upset  -trouble sleeping  This list may not describe all possible side effects. Call your doctor for medical advice about side effects. You may report side effects to FDA at 3-532-FDA-6762.  Where should I keep my medicine?  Keep out of the reach of children.  Store between 68 and 77 degrees F (20 and 25 degrees C). Keep bottle closed tightly. Throw away any unused medicine after the expiration date.  NOTE: This sheet is a summary. It may not cover all possible information. If you have questions about this medicine, talk to your doctor, pharmacist, or health care provider.  © 2018 Elsevier/Gold Standard (2009-03-10 14:10:59)      Cellulitis, Adult  Introduction  Cellulitis is a skin infection. The infected area is usually red and sore. This condition occurs most often in the arms and lower legs. It is very important to get treated for this condition.  Follow these instructions at home:  · Take over-the-counter and prescription medicines only as told by your doctor.  · If you were prescribed an antibiotic medicine, take it as told by your doctor. Do not stop taking the antibiotic even if you start to feel better.  · Drink enough fluid to keep your pee (urine) clear or pale yellow.  · Do not touch or rub the infected area.  · Raise (elevate) the infected area above the level of your heart while you are sitting or lying down.  · Place warm or cold wet cloths (warm or cold compresses) on the infected area. Do this as told by your doctor.  · Keep all follow-up visits as told by your doctor. This is important. These visits let your doctor make sure your infection is not getting worse.  Contact a doctor if:  · You have a fever.  · Your symptoms do not get better after 1-2 days of treatment.  · Your bone or joint under the infected area starts to hurt after the skin has healed.  · Your infection comes back. This can happen in the same area or another area.  · You have a swollen  bump in the infected area.  · You have new symptoms.  · You feel ill and also have muscle aches and pains.  Get help right away if:  · Your symptoms get worse.  · You feel very sleepy.  · You throw up (vomit) or have watery poop (diarrhea) for a long time.  · There are red streaks coming from the infected area.  · Your red area gets larger.  · Your red area turns darker.  This information is not intended to replace advice given to you by your health care provider. Make sure you discuss any questions you have with your health care provider.  Document Released: 06/05/2009 Document Revised: 05/25/2017 Document Reviewed: 10/26/2016  © 2017 Elsevier

## 2019-04-02 NOTE — ASSESSMENT & PLAN NOTE
Bilateral leg wounds, right worse than left  Did not look infected, mild erythema, no warmth, pus or drainage    Discontinued vancomycin  Keep legs elevated

## 2019-04-03 NOTE — PROGRESS NOTES
Pt discharged from floor in stable condition via ambulatory with myself and all belongings. Discharge instructions given, pt verbalized understanding. Cab voucher given. Bed bugs found while pt being discharged in belongings bag. Code clear called and CN notified at this time.

## 2019-04-04 LAB
BACTERIA BLD CULT: NORMAL
BACTERIA BLD CULT: NORMAL
SIGNIFICANT IND 70042: NORMAL
SIGNIFICANT IND 70042: NORMAL
SITE SITE: NORMAL
SITE SITE: NORMAL
SOURCE SOURCE: NORMAL
SOURCE SOURCE: NORMAL

## 2019-04-11 NOTE — DOCUMENTATION QUERY
[facility]                                                                         Query Response Note      PATIENT:               ANN GOODWIN  ACCT #:                  [id number]  MRN:                       [id number]  :                       1950  ADMIT DATE:       3/30/2019 5:09 PM  DISCH DATE:        2019 6:40 PM  RESPONDING  PROVIDER #:        [id number]           RESPONSE TEXT:    Acute metabolic encephalopathy    QUERY TEXT:    Altered Mental Status [id number]    Altered mental status is documented in the Medical Record.  Can a more specific diagnosis be provided?    NOTE:  If the appropriate response is not listed below, please respond with a new note.    The patient's Clinical Indicators include:  H&P Attestation by attending: Encephalopathic, more awake post resuscitation  H&P Resident Note: Altered mental status and mumbling  3/31 MD Note: Altered Mental status: Resolved, possible due to sepsis  Treatment: CT head, IVF infusion, Vancomycin, Zosyn, Uasyn, Limit sedatives  Risk Factors: Sepsis, Altered Mental Status, homeless, Pneumonia, Lung Mass  Query created by: Cira Olson on 2019 2:13 PM        Electronically signed by:  JEREMY M GONDA MD 2019 7:54 AM

## 2019-04-12 NOTE — DOCUMENTATION QUERY
[facility]                                                                       Query Response Note      PATIENT:               ANN GOODWIN  ACCT #:                  [id number]  MRN:                     [id number]  :                      1950  ADMIT DATE:       3/30/2019 5:09 PM  DISCH DATE:        2019 6:40 PM  RESPONDING  PROVIDER #:        [id number]           QUERY TEXT:    Please clarify in documentation the relationship, if any, between sepsis and  metabolic encephalopathy and/or acute respiratory failure:    The patient's Clinical Indicators include:  Patient admitted with sepsis, encephalopathy (per MD query response) and respiratory failure.  Please clarify if there is a link between sepsis and any organ dysfunction.    Query created by: Genie Hendricks on 2019 9:03 AM    RESPONSE TEXT:    Metabolic encephalopathy is due to or associated with Sepsis          Electronically signed by:  RADHA HUMPHREYS MD 2019 12:40 PM

## 2019-11-04 NOTE — ASSESSMENT & PLAN NOTE
Presumed lung mass noted on CT on admission  States [facility] [place] [facility] but nothing was done per pt    [facility]   Transposition Flap Text: The defect edges were debeveled with a #15 scalpel blade.  Given the location of the defect and the proximity to free margins a transposition flap was deemed most appropriate.  Using a sterile surgical marker, an appropriate transposition flap was drawn incorporating the defect.    The area thus outlined was incised deep to adipose tissue with a #15 scalpel blade.  The skin margins were undermined to an appropriate distance in all directions utilizing iris scissors.

## 2019-12-27 ENCOUNTER — HOSPITAL ENCOUNTER (EMERGENCY)
Facility: MEDICAL CENTER | Age: 69
End: 2019-12-28
Attending: EMERGENCY MEDICINE
Payer: MEDICARE

## 2019-12-27 DIAGNOSIS — Z76.0 MEDICATION REFILL: ICD-10-CM

## 2019-12-27 DIAGNOSIS — Z86.59 HISTORY OF SCHIZOPHRENIA: ICD-10-CM

## 2019-12-27 PROCEDURE — 99284 EMERGENCY DEPT VISIT MOD MDM: CPT

## 2019-12-28 VITALS
HEIGHT: 72 IN | BODY MASS INDEX: 25.38 KG/M2 | DIASTOLIC BLOOD PRESSURE: 106 MMHG | OXYGEN SATURATION: 95 % | WEIGHT: 187.39 LBS | TEMPERATURE: 99.9 F | RESPIRATION RATE: 16 BRPM | HEART RATE: 90 BPM | SYSTOLIC BLOOD PRESSURE: 148 MMHG

## 2019-12-28 PROCEDURE — A9270 NON-COVERED ITEM OR SERVICE: HCPCS | Performed by: EMERGENCY MEDICINE

## 2019-12-28 PROCEDURE — 700102 HCHG RX REV CODE 250 W/ 637 OVERRIDE(OP): Performed by: EMERGENCY MEDICINE

## 2019-12-28 RX ORDER — OLANZAPINE 5 MG/1
5 TABLET ORAL ONCE
Status: COMPLETED | OUTPATIENT
Start: 2019-12-28 | End: 2019-12-28

## 2019-12-28 RX ORDER — OLANZAPINE 5 MG/1
5 TABLET ORAL EVERY EVENING
Qty: 30 TAB | Refills: 0 | Status: SHIPPED | OUTPATIENT
Start: 2019-12-28 | End: 2020-10-23

## 2019-12-28 RX ORDER — OLANZAPINE 5 MG/1
5 TABLET ORAL EVERY EVENING
Status: DISCONTINUED | OUTPATIENT
Start: 2019-12-28 | End: 2019-12-28

## 2019-12-28 RX ADMIN — OLANZAPINE 5 MG: 5 TABLET, FILM COATED ORAL at 00:45

## 2019-12-28 NOTE — ED PROVIDER NOTES
ED Provider Note     Scribed for Clemencia Berkowitz D.O. by Matthew Rao. 12/27/2019, 11:22 PM.     Primary care provider: Pcp Pt States None  Means of arrival: Walk In         History obtained from: Patient  History limited by: Patient is a poor historian    CHIEF COMPLAINT  Chief Complaint   Patient presents with   • Medication Refill     pt states he has ran out of his meds for schizophrenia but cannot remember the name of the meds.       HPI  Nestor Strong is a 69 y.o. male who presents to the emergency Department seeking a refill of his medications. Patient reports having a history of schizophrenia for which he takes Zyprexa. Patient is unsure as to how long he has been off his medication, but believes it has been approximately one month. At this time he endorses having auditory hallucinations but denies any visual hallucinations, suicidal ideations or homicidal ideations.  Patient is currently homeless.    HPI limited secondary to patient being a poor historian    REVIEW OF SYSTEMS  Pertinent positives include medication refill, auditory hallucinations. Pertinent negatives include: No suicidal ideations, homicidal ideations, visual hallucinations  See HPI for further details.  ROS limited secondary to patient being a poor historian    PAST MEDICAL HISTORY  Past Medical History:   Diagnosis Date   • Diabetes    • Homeless    • Schizophrenia        FAMILY HISTORY  Family history reviewed. Family history not pertinent.    SOCIAL HISTORY  Social History     Tobacco Use   • Smoking status: Current Every Day Smoker   Substance Use Topics   • Alcohol use: Yes     Comment: occ   • Drug use: No      Social History     Substance and Sexual Activity   Drug Use No       SURGICAL HISTORY  Past Surgical History:   Procedure Laterality Date   • MASS EXCISION GENERAL  10/1/2010    Performed by SHANNEN LENNON at [facility]   • HERNIA REPAIR         CURRENT MEDICATIONS    Current Facility-Administered Medications:    •  OLANZapine (ZYPREXA) tablet 5 mg, 5 mg, Oral, Q EVENING, Clemencia Berkowitz D.O.  No current outpatient medications on file.    ALLERGIES  Allergies   Allergen Reactions   • Tape        PHYSICAL EXAM  VITAL SIGNS: /111   Pulse (!) 102   Temp 37.7 °C (99.9 °F) (Temporal)   Resp 16   Ht 1.829 m (6')   Wt 85 kg (187 lb 6.3 oz)   SpO2 95%   BMI 25.41 kg/m²     Constitutional: Patient is a well developed, disheveled unkempt elderly male, very sleepy.  HENT: Normocephalic, atraumatic.  Eyes: PERRL, EOMI, Conjunctiva without erythema or exudates.   Neck: Supple, No tenderness  Lymphatic: No lymphadenopathy noted.   Cardiovascular: Normal heart rate and Regular rhythm. No murmur  Thorax & Lungs: Clear and equal breath sounds with good excursion. No respiratory distress, no rhonchi, wheezing or rales.  Abdomen: Bowel sounds normal in all four quadrants. Soft,nontender, no rebound , guarding   Skin: Warm, Dry, No erythema, No rashes.  Very filthy.  Extremities: Peripheral pulses 4/4 No edema, No tenderness   Musculoskeletal: Normal range of motion in all major joints. No tenderness to palpation or major deformities noted.   Neurologic: Somnolent, Normal motor function, Normal sensory function, No lateralizing or focal deficits noted.    COURSE & MEDICAL DECISION MAKING  Pertinent Labs & Imaging studies reviewed. (See chart for details)    11:22 PM - Patient seen and evaluated at bedside. Discussed with the patient that I will review his records and attempt to find the dosage of Zyprexa he was on and refill his medication. He understands.    01:30 Patient will be discharged with Rx for Zyprexa.  He was treated with a dose of Zyprexa 5 mg orally here.  He is to follow-up with [facility] for further refills on his prescriptions and treatment.  He is stable upon discharge.      The patient is referred to a primary physician for blood pressure management, diabetic screening, and for all other  preventative health concerns.    DISPOSITION:  Patient will be discharged home in stable condition.    FOLLOW UP:  [place] [facility]    OUTPATIENT MEDICATIONS:  Zyprexa 5 mg #30    FINAL IMPRESSION  1. Medication refill    2. History of schizophrenia    3.  Homelessness     Matthew DALLAS (Scribe), am scribing for, and in the presence of, Clemencia Berkowitz D.O..    Electronically signed by: Matthew Rao (Scribe), 12/27/2019    IClemencia D.O. personally performed the services described in this documentation, as scribed by Matthew Rao in my presence, and it is both accurate and complete. E.    The note accurately reflects work and decisions made by me.  Clemencia Berkowitz  12/28/2019  1:53 AM

## 2019-12-28 NOTE — ED NOTES
Reviewed DC instructions with pt. Provided prescriptions X1. Pt verbalized understanding.Pt ambulatory with steady gait to lobby be transported home by self. VSS on room air. Pt A/Ox4 leaving unit. All questions answered

## 2019-12-28 NOTE — ED TRIAGE NOTES
Chief Complaint   Patient presents with   • Medication Refill     pt states he has ran out of his meds for schizophrenia but cannot remember the name of the meds.       Pt ambulatory to triage for above complaint. Pt appears disheveled in triage.    Pt is alert/oriented and follows commands. Pt speaking in full sentences and responds appropriately to questions. No acute distress noted in triage and respirations are even and unlabored.     Pt placed in lobby and educated on triage process. Pt encouraged to alert staff for any changes in condition.

## 2019-12-28 NOTE — ED NOTES
Medicated per MAR. 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 We told her nothing needs to be done acutely but we will monitor her closely for any outbreaks for rest of pregnancy, also told her about prospect of having to go for C section if she has a genital outbreak near delivery time.     Pt asking about vaginal discharge and getting flagyl for treatment. I informed the pt that especially since she is pregnant I do not want to rx her medication w/o actually doing a physical exam and examining the discharge therefore would like her to come in for this.     Signatures   Electronically signed by : SHYANN ALEMAN D.O.; Jan 11 2017  7:24PM CST

## 2019-12-28 NOTE — DISCHARGE INSTRUCTIONS
Follow-up with either [facility] or [facility] mental health .  Take your medication as directed regularly  Return if problems.

## 2020-10-23 ENCOUNTER — HOSPITAL ENCOUNTER (OUTPATIENT)
Facility: MEDICAL CENTER | Age: 70
End: 2020-10-23
Attending: EMERGENCY MEDICINE | Admitting: HOSPITALIST
Payer: MEDICARE

## 2020-10-23 ENCOUNTER — APPOINTMENT (OUTPATIENT)
Dept: RADIOLOGY | Facility: MEDICAL CENTER | Age: 70
End: 2020-10-23
Attending: EMERGENCY MEDICINE
Payer: MEDICARE

## 2020-10-23 ENCOUNTER — APPOINTMENT (OUTPATIENT)
Dept: RADIOLOGY | Facility: MEDICAL CENTER | Age: 70
End: 2020-10-23
Attending: HOSPITALIST
Payer: MEDICARE

## 2020-10-23 VITALS
BODY MASS INDEX: 25.73 KG/M2 | TEMPERATURE: 99.3 F | SYSTOLIC BLOOD PRESSURE: 151 MMHG | HEART RATE: 95 BPM | HEIGHT: 72 IN | WEIGHT: 190 LBS | DIASTOLIC BLOOD PRESSURE: 101 MMHG | OXYGEN SATURATION: 92 % | RESPIRATION RATE: 16 BRPM

## 2020-10-23 DIAGNOSIS — R91.8 LUNG MASS: ICD-10-CM

## 2020-10-23 DIAGNOSIS — R79.89 ELEVATED TROPONIN: ICD-10-CM

## 2020-10-23 DIAGNOSIS — R60.0 LEG EDEMA, RIGHT: ICD-10-CM

## 2020-10-23 DIAGNOSIS — R07.9 ACUTE CHEST PAIN: ICD-10-CM

## 2020-10-23 PROBLEM — B85.2 PEDICULOSIS: Status: ACTIVE | Noted: 2020-10-23

## 2020-10-23 PROBLEM — R07.89 ATYPICAL CHEST PAIN: Status: ACTIVE | Noted: 2020-10-23

## 2020-10-23 LAB
ALBUMIN SERPL BCP-MCNC: 3.4 G/DL (ref 3.2–4.9)
ALBUMIN/GLOB SERPL: 1 G/DL
ALP SERPL-CCNC: 90 U/L (ref 30–99)
ALT SERPL-CCNC: 11 U/L (ref 2–50)
ANION GAP SERPL CALC-SCNC: 11 MMOL/L (ref 7–16)
AST SERPL-CCNC: 16 U/L (ref 12–45)
BASOPHILS # BLD AUTO: 0.7 % (ref 0–1.8)
BASOPHILS # BLD: 0.05 K/UL (ref 0–0.12)
BILIRUB SERPL-MCNC: 0.6 MG/DL (ref 0.1–1.5)
BUN SERPL-MCNC: 15 MG/DL (ref 8–22)
CALCIUM SERPL-MCNC: 8.7 MG/DL (ref 8.4–10.2)
CHLORIDE SERPL-SCNC: 104 MMOL/L (ref 96–112)
CO2 SERPL-SCNC: 27 MMOL/L (ref 20–33)
COVID ORDER STATUS COVID19: NORMAL
CREAT SERPL-MCNC: 0.8 MG/DL (ref 0.5–1.4)
EKG IMPRESSION: NORMAL
EOSINOPHIL # BLD AUTO: 0.18 K/UL (ref 0–0.51)
EOSINOPHIL NFR BLD: 2.5 % (ref 0–6.9)
ERYTHROCYTE [DISTWIDTH] IN BLOOD BY AUTOMATED COUNT: 44.9 FL (ref 35.9–50)
ETHANOL BLD-MCNC: <10.1 MG/DL (ref 0–10)
GLOBULIN SER CALC-MCNC: 3.4 G/DL (ref 1.9–3.5)
GLUCOSE SERPL-MCNC: 97 MG/DL (ref 65–99)
HCT VFR BLD AUTO: 41.9 % (ref 42–52)
HGB BLD-MCNC: 13.1 G/DL (ref 14–18)
IMM GRANULOCYTES # BLD AUTO: 0.04 K/UL (ref 0–0.11)
IMM GRANULOCYTES NFR BLD AUTO: 0.6 % (ref 0–0.9)
INR PPP: 1.01 (ref 0.87–1.13)
LIPASE SERPL-CCNC: 17 U/L (ref 7–58)
LYMPHOCYTES # BLD AUTO: 1.54 K/UL (ref 1–4.8)
LYMPHOCYTES NFR BLD: 21.7 % (ref 22–41)
MCH RBC QN AUTO: 25.3 PG (ref 27–33)
MCHC RBC AUTO-ENTMCNC: 31.3 G/DL (ref 33.7–35.3)
MCV RBC AUTO: 81 FL (ref 81.4–97.8)
MONOCYTES # BLD AUTO: 0.85 K/UL (ref 0–0.85)
MONOCYTES NFR BLD AUTO: 12 % (ref 0–13.4)
NEUTROPHILS # BLD AUTO: 4.44 K/UL (ref 1.82–7.42)
NEUTROPHILS NFR BLD: 62.5 % (ref 44–72)
NRBC # BLD AUTO: 0 K/UL
NRBC BLD-RTO: 0 /100 WBC
PLATELET # BLD AUTO: 420 K/UL (ref 164–446)
PMV BLD AUTO: 9.1 FL (ref 9–12.9)
POTASSIUM SERPL-SCNC: 3.9 MMOL/L (ref 3.6–5.5)
PROT SERPL-MCNC: 6.8 G/DL (ref 6–8.2)
PROTHROMBIN TIME: 13 SEC (ref 12–14.6)
RBC # BLD AUTO: 5.17 M/UL (ref 4.7–6.1)
SARS-COV+SARS-COV-2 AG RESP QL IA.RAPID: NOTDETECTED
SODIUM SERPL-SCNC: 142 MMOL/L (ref 135–145)
SPECIMEN SOURCE: NORMAL
TROPONIN T SERPL-MCNC: 37 NG/L (ref 6–19)
TROPONIN T SERPL-MCNC: 40 NG/L (ref 6–19)
WBC # BLD AUTO: 7.1 K/UL (ref 4.8–10.8)

## 2020-10-23 PROCEDURE — 80307 DRUG TEST PRSMV CHEM ANLYZR: CPT

## 2020-10-23 PROCEDURE — 87426 SARSCOV CORONAVIRUS AG IA: CPT

## 2020-10-23 PROCEDURE — G0378 HOSPITAL OBSERVATION PER HR: HCPCS

## 2020-10-23 PROCEDURE — 99215 OFFICE O/P EST HI 40 MIN: CPT | Performed by: INTERNAL MEDICINE

## 2020-10-23 PROCEDURE — 700117 HCHG RX CONTRAST REV CODE 255: Performed by: EMERGENCY MEDICINE

## 2020-10-23 PROCEDURE — 93010 ELECTROCARDIOGRAM REPORT: CPT | Performed by: INTERNAL MEDICINE

## 2020-10-23 PROCEDURE — 83690 ASSAY OF LIPASE: CPT

## 2020-10-23 PROCEDURE — 93005 ELECTROCARDIOGRAM TRACING: CPT | Performed by: HOSPITALIST

## 2020-10-23 PROCEDURE — C9803 HOPD COVID-19 SPEC COLLECT: HCPCS | Performed by: EMERGENCY MEDICINE

## 2020-10-23 PROCEDURE — A9270 NON-COVERED ITEM OR SERVICE: HCPCS | Performed by: HOSPITALIST

## 2020-10-23 PROCEDURE — 93005 ELECTROCARDIOGRAM TRACING: CPT | Mod: 77

## 2020-10-23 PROCEDURE — 85025 COMPLETE CBC W/AUTO DIFF WBC: CPT

## 2020-10-23 PROCEDURE — 700102 HCHG RX REV CODE 250 W/ 637 OVERRIDE(OP): Performed by: HOSPITALIST

## 2020-10-23 PROCEDURE — 99285 EMERGENCY DEPT VISIT HI MDM: CPT

## 2020-10-23 PROCEDURE — 80053 COMPREHEN METABOLIC PANEL: CPT

## 2020-10-23 PROCEDURE — 71275 CT ANGIOGRAPHY CHEST: CPT

## 2020-10-23 PROCEDURE — 71045 X-RAY EXAM CHEST 1 VIEW: CPT

## 2020-10-23 PROCEDURE — 85610 PROTHROMBIN TIME: CPT

## 2020-10-23 PROCEDURE — 84484 ASSAY OF TROPONIN QUANT: CPT

## 2020-10-23 PROCEDURE — 99236 HOSP IP/OBS SAME DATE HI 85: CPT | Performed by: HOSPITALIST

## 2020-10-23 RX ORDER — LABETALOL HYDROCHLORIDE 5 MG/ML
10 INJECTION, SOLUTION INTRAVENOUS EVERY 6 HOURS PRN
Status: DISCONTINUED | OUTPATIENT
Start: 2020-10-23 | End: 2020-10-23 | Stop reason: HOSPADM

## 2020-10-23 RX ORDER — POLYETHYLENE GLYCOL 3350 17 G/17G
1 POWDER, FOR SOLUTION ORAL
Status: DISCONTINUED | OUTPATIENT
Start: 2020-10-23 | End: 2020-10-23 | Stop reason: HOSPADM

## 2020-10-23 RX ORDER — REGADENOSON 0.08 MG/ML
0.4 INJECTION, SOLUTION INTRAVENOUS
Status: DISCONTINUED | OUTPATIENT
Start: 2020-10-23 | End: 2020-10-23 | Stop reason: HOSPADM

## 2020-10-23 RX ORDER — ASPIRIN 325 MG
325 TABLET ORAL DAILY
Status: DISCONTINUED | OUTPATIENT
Start: 2020-10-23 | End: 2020-10-23 | Stop reason: HOSPADM

## 2020-10-23 RX ORDER — ONDANSETRON 4 MG/1
4 TABLET, ORALLY DISINTEGRATING ORAL EVERY 4 HOURS PRN
Status: DISCONTINUED | OUTPATIENT
Start: 2020-10-23 | End: 2020-10-23 | Stop reason: HOSPADM

## 2020-10-23 RX ORDER — AMINOPHYLLINE 25 MG/ML
100 INJECTION, SOLUTION INTRAVENOUS
Status: DISCONTINUED | OUTPATIENT
Start: 2020-10-23 | End: 2020-10-23 | Stop reason: HOSPADM

## 2020-10-23 RX ORDER — ONDANSETRON 2 MG/ML
4 INJECTION INTRAMUSCULAR; INTRAVENOUS EVERY 4 HOURS PRN
Status: DISCONTINUED | OUTPATIENT
Start: 2020-10-23 | End: 2020-10-23 | Stop reason: HOSPADM

## 2020-10-23 RX ORDER — ASPIRIN 600 MG/1
300 SUPPOSITORY RECTAL DAILY
Status: DISCONTINUED | OUTPATIENT
Start: 2020-10-23 | End: 2020-10-23 | Stop reason: HOSPADM

## 2020-10-23 RX ORDER — AMOXICILLIN 250 MG
2 CAPSULE ORAL 2 TIMES DAILY
Status: DISCONTINUED | OUTPATIENT
Start: 2020-10-23 | End: 2020-10-23

## 2020-10-23 RX ORDER — BISACODYL 10 MG
10 SUPPOSITORY, RECTAL RECTAL
Status: DISCONTINUED | OUTPATIENT
Start: 2020-10-23 | End: 2020-10-23 | Stop reason: HOSPADM

## 2020-10-23 RX ORDER — ASPIRIN 81 MG/1
324 TABLET, CHEWABLE ORAL DAILY
Status: DISCONTINUED | OUTPATIENT
Start: 2020-10-23 | End: 2020-10-23 | Stop reason: HOSPADM

## 2020-10-23 RX ORDER — ACETAMINOPHEN 325 MG/1
650 TABLET ORAL EVERY 6 HOURS PRN
Status: DISCONTINUED | OUTPATIENT
Start: 2020-10-23 | End: 2020-10-23 | Stop reason: HOSPADM

## 2020-10-23 RX ADMIN — IOHEXOL 132 ML: 350 INJECTION, SOLUTION INTRAVENOUS at 03:07

## 2020-10-23 RX ADMIN — ASPIRIN 81 MG CHEWABLE TABLET 324 MG: 81 TABLET CHEWABLE at 05:52

## 2020-10-23 ASSESSMENT — ENCOUNTER SYMPTOMS
CHILLS: 0
SORE THROAT: 0
BACK PAIN: 0
RESPIRATORY NEGATIVE: 1
SENSORY CHANGE: 0
WHEEZING: 0
PALPITATIONS: 0
HALLUCINATIONS: 1
DOUBLE VISION: 0
DEPRESSION: 0
BLURRED VISION: 0
COUGH: 1
STRIDOR: 0
WEAKNESS: 0
EYE DISCHARGE: 0
SINUS PAIN: 0
DIZZINESS: 0
SPUTUM PRODUCTION: 0
FLANK PAIN: 0
NAUSEA: 0
CONSTITUTIONAL NEGATIVE: 1
HEADACHES: 0
FOCAL WEAKNESS: 0
INSOMNIA: 0
DIARRHEA: 0
SEIZURES: 0
GASTROINTESTINAL NEGATIVE: 1
MYALGIAS: 0
HALLUCINATIONS: 0
ORTHOPNEA: 0
SHORTNESS OF BREATH: 1
BLOOD IN STOOL: 0
EYE PAIN: 0
FEVER: 0
COUGH: 0
EYES NEGATIVE: 1
WEIGHT LOSS: 0
NECK PAIN: 0
ABDOMINAL PAIN: 0
LOSS OF CONSCIOUSNESS: 0
SHORTNESS OF BREATH: 0
VOMITING: 0
BRUISES/BLEEDS EASILY: 0

## 2020-10-23 ASSESSMENT — FIBROSIS 4 INDEX: FIB4 SCORE: 1.34

## 2020-10-23 ASSESSMENT — PAIN DESCRIPTION - PAIN TYPE: TYPE: CHRONIC PAIN

## 2020-10-23 NOTE — PROGRESS NOTES
Monitor Summary     Rhythm: SR 74   Measurements: 0.18/0.08/0.40  ECTOPIES: R PVC        Normal Values  Rhythm SR  HR Range    Measurements 0.12-0.20 / 0.06-0.10  / 0.30-0.52

## 2020-10-23 NOTE — PROGRESS NOTES
Patient refusing stress test. Would like to be discharged at this time and talk to his PCP before proceeding with stress test.

## 2020-10-23 NOTE — ED NOTES
Receiving RN called this RN back and report was given to them. Receiving RN - Rita - had no questions or concerns and is ready for pt transport. Pt in stable condition despite condition. Pt is at baseline

## 2020-10-23 NOTE — PROGRESS NOTES
Pt refusing to wear hospital gown.  Pt refusing skin assessment.  Pt refusing to answer admit profile.

## 2020-10-23 NOTE — CONSULTS
Pulmonary Consultation    Date of consult: 10/23/2020    Referring Physician  Perez Moseley M.D.    Reason for Consultation  Abnormal CT chest    History of Presenting Illness  Nestor Strong is a very pleasant 70 y.o. homeless male active tobacco smoker who presented 10/23/2020 with complaints of chest pain and shortness of breath. CT was performed showing a large RLL mass with endobronchial obstruction. He denies any weight loss, hemoptysis or fevers. Labs show no significant electrolyte abnormalities.  Pulmonary consulted for endobronchial evaluation and biopsy. Pt has a history of schizophrenia and [facility]     Code Status  Full Code    Review of Systems  Review of Systems   Constitutional: Negative for chills, fever and weight loss.   HENT: Negative for congestion, sinus pain and sore throat.    Eyes: Negative for pain and discharge.   Respiratory: Positive for cough and shortness of breath. Negative for sputum production, wheezing and stridor.    Cardiovascular: Positive for chest pain. Negative for orthopnea and leg swelling.   Gastrointestinal: Negative for abdominal pain, diarrhea, nausea and vomiting.   Genitourinary: Negative for dysuria, frequency and urgency.   Musculoskeletal: Negative for myalgias.   Skin: Negative for rash.   Neurological: Negative for dizziness, sensory change, focal weakness, loss of consciousness and headaches.   Psychiatric/Behavioral: Positive for hallucinations (none active, history).   All other systems reviewed and are negative.    Past Medical History   has a past medical history of Diabetes, Homeless, and Schizophrenia ([facility]).    Surgical History   has a past surgical history that includes hernia repair and mass excision general (10/1/2010).    Family History  Family history reviewed and no significant conditions or diseases relevant to the chief complaint were identified    Social History   reports that he has been smoking  cigarettes. He does not have any smokeless tobacco history on file. He reports current alcohol use. He reports current drug use.    Medications  Home Medications     Reviewed by Perez Moseley M.D. (Physician) on 10/23/20 at 0738  Med List Status: Complete   Medication Last Dose Status        Patient Johnnie Taking any Medications                     Current Facility-Administered Medications   Medication Dose Route Frequency Provider Last Rate Last Dose   • aspirin (ASA) tablet 325 mg  325 mg Oral DAILY Vidhya Vargas M.D.        Or   • aspirin (ASA) chewable tab 324 mg  324 mg Oral DAILY Vidhya Vargas M.D.   324 mg at 10/23/20 0552    Or   • aspirin (ASA) suppository 300 mg  300 mg Rectal DAILY Vidhya Vargas M.D.       • polyethylene glycol/lytes (MIRALAX) PACKET 1 Packet  1 Packet Oral QDAY PRN Vidhya Vargas M.D.        And   • magnesium hydroxide (MILK OF MAGNESIA) suspension 30 mL  30 mL Oral QDAY PRN Vidhya Vargas M.D.        And   • bisacodyl (DULCOLAX) suppository 10 mg  10 mg Rectal QDAY PRN Vidhya Vargas M.D.       • regadenoson (LEXISCAN) injection SOLN 0.4 mg  0.4 mg Intravenous Once PRN Vidhya Vargas M.D.       • aminophylline injection 100 mg  100 mg Intravenous Q5 MIN PRN Vidhya Vargas M.D.       • enoxaparin (LOVENOX) inj 40 mg  40 mg Subcutaneous DAILY Vidhya Vargas M.D.       • acetaminophen (TYLENOL) tablet 650 mg  650 mg Oral Q6HRS PRN Vidhya Vargas M.D.       • ondansetron (ZOFRAN) syringe/vial injection 4 mg  4 mg Intravenous Q4HRS PRN Vidhya Vargas M.D.       • ondansetron (ZOFRAN ODT) dispertab 4 mg  4 mg Oral Q4HRS PRN Vidhya Vargas M.D.       • labetalol (NORMODYNE/TRANDATE) injection 10 mg  10 mg Intravenous Q6HRS PRN Vidhya Vargas M.D.           Allergies  Allergies   Allergen Reactions   • Tape        Vital Signs last 24 hours  Temp:  [36.8 °C (98.3 °F)-36.9 °C  (98.5 °F)] 36.8 °C (98.3 °F)  Pulse:  [81-96] 96  Resp:  [16-24] 16  BP: (151-169)/() 153/95  SpO2:  [88 %-92 %] 91 %    Physical Exam  Physical Exam  Constitutional:       General: He is not in acute distress.     Appearance: He is well-developed. He is not diaphoretic.      Comments: Disheveled, unkept   HENT:      Nose: Nose normal.      Mouth/Throat:      Pharynx: No oropharyngeal exudate.   Eyes:      General: No scleral icterus.        Right eye: No discharge.         Left eye: No discharge.      Conjunctiva/sclera: Conjunctivae normal.      Pupils: Pupils are equal, round, and reactive to light.   Neck:      Musculoskeletal: Neck supple.      Thyroid: No thyromegaly.      Vascular: No JVD.      Trachea: No tracheal deviation.   Cardiovascular:      Rate and Rhythm: Normal rate and regular rhythm.      Heart sounds: Normal heart sounds. No murmur.   Pulmonary:      Effort: Pulmonary effort is normal. No respiratory distress.      Breath sounds: No stridor. Wheezing and rhonchi (right) present. No rales.   Abdominal:      General: There is no distension.      Palpations: Abdomen is soft.      Tenderness: There is no abdominal tenderness. There is no guarding.   Musculoskeletal: Normal range of motion.         General: No tenderness.   Lymphadenopathy:      Cervical: No cervical adenopathy.   Skin:     General: Skin is warm and dry.      Capillary Refill: Capillary refill takes less than 2 seconds.      Coloration: Skin is not pale.      Findings: No erythema.   Neurological:      Mental Status: He is alert and oriented to person, place, and time.      Sensory: No sensory deficit.      Motor: No abnormal muscle tone.      Coordination: Coordination normal.      Deep Tendon Reflexes: Reflexes normal.   Psychiatric:         Behavior: Behavior normal.         Thought Content: Thought content normal.         Judgment: Judgment normal.       Fluids  No intake or output data in the 24 hours ending 10/23/20  1214    Laboratory  Recent Results (from the past 48 hour(s))   CBC w/ Differential    Collection Time: 10/23/20  1:03 AM   Result Value Ref Range    WBC 7.1 4.8 - 10.8 K/uL    RBC 5.17 4.70 - 6.10 M/uL    Hemoglobin 13.1 (L) 14.0 - 18.0 g/dL    Hematocrit 41.9 (L) 42.0 - 52.0 %    MCV 81.0 (L) 81.4 - 97.8 fL    MCH 25.3 (L) 27.0 - 33.0 pg    MCHC 31.3 (L) 33.7 - 35.3 g/dL    RDW 44.9 35.9 - 50.0 fL    Platelet Count 420 164 - 446 K/uL    MPV 9.1 9.0 - 12.9 fL    Neutrophils-Polys 62.50 44.00 - 72.00 %    Lymphocytes 21.70 (L) 22.00 - 41.00 %    Monocytes 12.00 0.00 - 13.40 %    Eosinophils 2.50 0.00 - 6.90 %    Basophils 0.70 0.00 - 1.80 %    Immature Granulocytes 0.60 0.00 - 0.90 %    Nucleated RBC 0.00 /100 WBC    Neutrophils (Absolute) 4.44 1.82 - 7.42 K/uL    Lymphs (Absolute) 1.54 1.00 - 4.80 K/uL    Monos (Absolute) 0.85 0.00 - 0.85 K/uL    Eos (Absolute) 0.18 0.00 - 0.51 K/uL    Baso (Absolute) 0.05 0.00 - 0.12 K/uL    Immature Granulocytes (abs) 0.04 0.00 - 0.11 K/uL    NRBC (Absolute) 0.00 K/uL   Complete Metabolic Panel (CMP)    Collection Time: 10/23/20  1:03 AM   Result Value Ref Range    Sodium 142 135 - 145 mmol/L    Potassium 3.9 3.6 - 5.5 mmol/L    Chloride 104 96 - 112 mmol/L    Co2 27 20 - 33 mmol/L    Anion Gap 11.0 7.0 - 16.0    Glucose 97 65 - 99 mg/dL    Bun 15 8 - 22 mg/dL    Creatinine 0.80 0.50 - 1.40 mg/dL    Calcium 8.7 8.4 - 10.2 mg/dL    AST(SGOT) 16 12 - 45 U/L    ALT(SGPT) 11 2 - 50 U/L    Alkaline Phosphatase 90 30 - 99 U/L    Total Bilirubin 0.6 0.1 - 1.5 mg/dL    Albumin 3.4 3.2 - 4.9 g/dL    Total Protein 6.8 6.0 - 8.2 g/dL    Globulin 3.4 1.9 - 3.5 g/dL    A-G Ratio 1.0 g/dL   Troponin STAT    Collection Time: 10/23/20  1:03 AM   Result Value Ref Range    Troponin T 37 (H) 6 - 19 ng/L   Lipase    Collection Time: 10/23/20  1:03 AM   Result Value Ref Range    Lipase 17 7 - 58 U/L   ESTIMATED GFR    Collection Time: 10/23/20  1:03 AM   Result Value Ref Range    GFR If   American >60 >60 mL/min/1.73 m 2    GFR If Non African American >60 >60 mL/min/1.73 m 2   ETHYL ALCOHOL (BLOOD)    Collection Time: 10/23/20  1:03 AM   Result Value Ref Range    Diagnostic Alcohol <10.1 0.0 - 10.0 mg/dL   Prothrombin Time    Collection Time: 10/23/20  1:03 AM   Result Value Ref Range    PT 13.0 12.0 - 14.6 sec    INR 1.01 0.87 - 1.13   EKG    Collection Time: 10/23/20  1:15 AM   Result Value Ref Range    Report       [facility] Emergency Dept.    Test Date:  2020-10-23  Pt Name:    ANN GOODWIN                Department: [facility]  MRN:        [id number]                      [facility]  Gender:     Male                         Technician: [id number]  :        1950                   Requested By:LIZZIE LEOS  Order #:    [id number]                    Reading MD:    Measurements  Intervals                                Axis  Rate:       69                           P:          67  TN:         164                          QRS:        -40  QRSD:       82                           T:          45  QT:         408  QTc:        437    Interpretive Statements  SINUS RHYTHM  ATRIAL PREMATURE COMPLEX  LEFT AXIS DEVIATION  CONSIDER ANTEROSEPTAL INFARCT  Compared to ECG 2019 22:08:53  Atrial premature complex(es) now present  Left-axis deviation now present  Myocardial infarct finding now present  Sinus tachycardia no longer present  Left anterior fascicular block no long er present  Poor R-wave progression no longer present     COVID/SARS CoV-2 PCR    Collection Time: 10/23/20  2:15 AM    Specimen: Nasopharyngeal; Respirate   Result Value Ref Range    COVID Order Status Received    SARS-COV Antigen SALVATORE    Collection Time: 10/23/20  2:15 AM   Result Value Ref Range    SARS-CoV-2 Source Nasal Swab     SARS-COV ANTIGEN SALVATORE NotDetected Not-Detected   Troponin in four (4) hours    Collection Time: 10/23/20  6:09 AM   Result Value Ref Range    Troponin T 40 (H) 6 - 19 ng/L    EKG in four (4) hours    Collection Time: 10/23/20  6:34 AM   Result Value Ref Range    Report       [facility]    Test Date:  2020-10-23  Pt Name:    ANN GOODWIN                Department: [facility]  MRN:        [id number]                      Room:       3312  Gender:     Male                         Technician: [id number]  :        1950                   Requested By:CHUCK PERALES  Order #:    [id number]                    Reading MD: Eduard Velez MD    Measurements  Intervals                                Axis  Rate:       83                           P:          42  DC:         157                          QRS:        -45  QRSD:       90                           T:          24  QT:         404  QTc:        475    Interpretive Statements  Sinus rhythm  Left anterior fascicular block  Electronically Signed On 10- 8:24:53 PDT by Eduard Velez MD       Imaging    CT-CTA CHEST PULMONARY ARTERY W/ RECONS   Final Result         1.  Occlusion of the right middle lobe pulmonary artery, appears likely due to external compression by large right infrahilar pulmonary mass. Evaluation of the pulmonary arteries is limited due to motion artifacts, no large central pulmonary embolus is    appreciated. Evaluation of the distal segmental and subsegmental branches is largely nondiagnostic.   2.  Obstruction of the right lower lobe bronchus due to large right infrahilar pulmonary mass.   3.  Right infrahilar pulmonary mass, should be considered neoplastic unless proven otherwise. Findings correspond with chest x-ray.   4.  Atherosclerosis and atherosclerotic coronary artery disease.      DX-CHEST-PORTABLE (1 VIEW)   Final Result         1.  Right lung base round opacity, concerning for pulmonary mass, recommend further evaluation with CT chest for further characterization.   2.  Cardiomegaly      These findings were discussed with the patient's clinician, Cole Jewell, on 10/23/2020 2:00 AM.         Assessment/Plan    Mass of right lung- (present on admission)  Assessment & Plan  Certainly this is malignancy, given size and location particular concern for small cell carcinoma with endobronchial obstruction  Concern for possible liver metastasis as well on my read  No evidence of paraneoplastic disorder    Discussed with pt need for biopsy for tissue diagnosis prior to treatment. Best method would be bronchoscopy, which we could do this afternoon. He declined, stating he would like to be discharged AGAINST MEDICAL ADVICE and plans to go the [facility] where he would like to get the opinion of his PCP and pursue treatment there. He demonstrated medical decision-making capacity and expressed adequate understanding of the consequences of delayed treatment. Offered to facilitate an interfacility transfer and he declined.    Discussed patient condition and risk of morbidity and/or mortality with Hospitalist, RN and Patient.      This note was generated using voice recognition software which has a chance of producing errors of grammar and content.  I have made every reasonable attempt to find and correct any errors, but it should be expected that some may not be found prior to finalization of this note.  __________  Efrain Rocha MD  Pulmonary and Critical Care Medicine  [facility]

## 2020-10-23 NOTE — ED TRIAGE NOTES
Chief Complaint   Patient presents with   • Shortness of Breath     sob and chest pain for ten years. baseline per patient   • Chest Pain     ED Triage Vitals [10/23/20 0111]   Enc Vitals Group      Blood Pressure  (!) 169/112      Pulse 81      Respiration (!) 24      Temperature 36.9 °C (98.5 °F)      Temp src Temporal      Pulse Oximetry 92 %      Weight 86.2 kg (190 lb)      Height 1.829 m (6')     Patient restless and argumentative with staff. States his symptoms are per his baseline. EKG delayed due to patient being uncooperative, keeps removing equipment.

## 2020-10-23 NOTE — DISCHARGE INSTRUCTIONS
Discharge Instructions per Perez Moseley M.D.    You were hospitalized for chest pain. You did not complete a stress test to determine if it was caused by heart disease. You have a Right lung mass that is most likely cancer and should be biopsied for definitive diagnosis. You decided to leave the hospital before completing your testing and [facility].    Upon discharge, an insect concerning for lice was found on your body. [facility] notify the [facility] of [facility] for [facility].    DIET: Regular    ACTIVITY: As tolerated    DIAGNOSIS: Atypical chest pain attributed to Right hilar mass    Return to ER if you have additional chest pain, are unable to breathe, cough up blood, or faint for any reason.

## 2020-10-23 NOTE — RESPIRATORY CARE
COPD EDUCATION by COPD CLINICAL EDUCATOR  10/23/2020 at 11:10 AM by Sherry Carr, RRT     Patient reviewed by COPD education team. Patient does not have a history or diagnosis of COPD. Patient is a smoker, smoking cessation education refused. Patient states he is addicted and doesn't want to quit.

## 2020-10-23 NOTE — PROGRESS NOTES
Telemetry Shift Summary     Rhythm SR  HR Range 80s  Ectopy none  Measurements 0.16/0.08/0.36           Normal Values  Rhythm SR  HR Range    Measurements 0.12-0.20 / 0.06-0.10  / 0.30-0.52

## 2020-10-23 NOTE — PROGRESS NOTES
[employer] from [employer] called to identify specimen from [employer] as human lice and parasite, results read back to [employer]. EVS notified to perform proper cleaning.

## 2020-10-23 NOTE — ED PROVIDER NOTES
"CHIEF COMPLAINT  Chief Complaint   Patient presents with   • Shortness of Breath     sob and chest pain for ten years. baseline per patient   • Chest Pain       HPI  HPI  70-year-old male with past medical history of \"bad Cancer\" brought in by EMS for acute chest pain.  Patient reports that he began to have chest pain tonight while at rest.  Patient denies any recent trauma.  Patient reports that he has been feeling worse shortness of breath over the past couple weeks.  Patient states that his right lower extremity has been chronically swollen and no recent changes.  Patient denies any hemoptysis.  Pt denies hx of PE or DVT.   Pt reports drinking alcohol tonight prior to arrival and is unsure how much.       REVIEW OF SYSTEMS  Review of Systems   Constitutional: Negative.  Negative for fever.   HENT: Negative.  Negative for ear pain and sore throat.    Eyes: Negative.  Negative for pain.   Respiratory: Negative.  Negative for shortness of breath.    Cardiovascular: Positive for chest pain.   Gastrointestinal: Negative.  Negative for abdominal pain and blood in stool.   Genitourinary: Negative for dysuria and flank pain.   Musculoskeletal: Negative for back pain, myalgias and neck pain.   Skin: Negative.  Negative for rash.   Neurological: Negative for focal weakness, seizures, weakness and headaches.   Endo/Heme/Allergies: Does not bruise/bleed easily.   Psychiatric/Behavioral: Negative for hallucinations and suicidal ideas.   All other systems reviewed and are negative.      PAST MEDICAL HISTORY   has a past medical history of Diabetes, Homeless, and Schizophrenia (HCC).    SOCIAL HISTORY  Social History     Tobacco Use   • Smoking status: Current Every Day Smoker     Types: Cigarettes   Substance and Sexual Activity   • Alcohol use: Yes     Comment: occ   • Drug use: Yes     Comment: meth occ   • Sexual activity: Not on file       SURGICAL HISTORY   has a past surgical history that includes hernia repair and mass " excision general (10/1/2010).    CURRENT MEDICATIONS  Home Medications     Reviewed by Vinay Cee R.N. (Registered Nurse) on 10/23/20 at 0114  Med List Status: Complete   Medication Last Dose Status        Patient Johnnie Taking any Medications                       ALLERGIES  Allergies   Allergen Reactions   • Tape        PHYSICAL EXAM  VITAL SIGNS: BP (!) 168/90   Pulse 95   Temp 36.9 °C (98.5 °F) (Temporal)   Resp 18   Ht 1.829 m (6')   Wt 86.2 kg (190 lb)   SpO2 88%   BMI 25.77 kg/m²  @MARIBEL[[id number]::@  Pulse ox interpretation: I interpret this pulse ox as normal.    Physical Exam   Constitutional: He is oriented to person, place, and time.   Ill appearing and covered in dirt.   HENT:   Head: Normocephalic.   Right Ear: External ear normal.   Left Ear: External ear normal.   Mouth/Throat: No oropharyngeal exudate.   Eyes: Pupils are equal, round, and reactive to light. EOM are normal. No scleral icterus.   Neck: Normal range of motion.   Cardiovascular: Normal rate.   Pulmonary/Chest: Effort normal. No respiratory distress.   diminished breath sounds in b/l bases   Abdominal: He exhibits no distension. There is no abdominal tenderness.   Musculoskeletal: Normal range of motion.         General: Edema (RLE 2+ pitting. ) present. No deformity.   Neurological: He is alert and oriented to person, place, and time. Coordination normal.   Skin: Skin is warm and dry. No rash noted. No erythema.   Psychiatric: Affect and judgment normal.   Nursing note and vitals reviewed.      DIAGNOSTIC STUDIES / PROCEDURES    LABS/EKG  Results for orders placed or performed during the hospital encounter of 10/23/20   CBC w/ Differential   Result Value Ref Range    WBC 7.1 4.8 - 10.8 K/uL    RBC 5.17 4.70 - 6.10 M/uL    Hemoglobin 13.1 (L) 14.0 - 18.0 g/dL    Hematocrit 41.9 (L) 42.0 - 52.0 %    MCV 81.0 (L) 81.4 - 97.8 fL    MCH 25.3 (L) 27.0 - 33.0 pg    MCHC 31.3 (L) 33.7 - 35.3 g/dL    RDW 44.9 35.9 - 50.0 fL    Platelet  Count 420 164 - 446 K/uL    MPV 9.1 9.0 - 12.9 fL    Neutrophils-Polys 62.50 44.00 - 72.00 %    Lymphocytes 21.70 (L) 22.00 - 41.00 %    Monocytes 12.00 0.00 - 13.40 %    Eosinophils 2.50 0.00 - 6.90 %    Basophils 0.70 0.00 - 1.80 %    Immature Granulocytes 0.60 0.00 - 0.90 %    Nucleated RBC 0.00 /100 WBC    Neutrophils (Absolute) 4.44 1.82 - 7.42 K/uL    Lymphs (Absolute) 1.54 1.00 - 4.80 K/uL    Monos (Absolute) 0.85 0.00 - 0.85 K/uL    Eos (Absolute) 0.18 0.00 - 0.51 K/uL    Baso (Absolute) 0.05 0.00 - 0.12 K/uL    Immature Granulocytes (abs) 0.04 0.00 - 0.11 K/uL    NRBC (Absolute) 0.00 K/uL   Complete Metabolic Panel (CMP)   Result Value Ref Range    Sodium 142 135 - 145 mmol/L    Potassium 3.9 3.6 - 5.5 mmol/L    Chloride 104 96 - 112 mmol/L    Co2 27 20 - 33 mmol/L    Anion Gap 11.0 7.0 - 16.0    Glucose 97 65 - 99 mg/dL    Bun 15 8 - 22 mg/dL    Creatinine 0.80 0.50 - 1.40 mg/dL    Calcium 8.7 8.4 - 10.2 mg/dL    AST(SGOT) 16 12 - 45 U/L    ALT(SGPT) 11 2 - 50 U/L    Alkaline Phosphatase 90 30 - 99 U/L    Total Bilirubin 0.6 0.1 - 1.5 mg/dL    Albumin 3.4 3.2 - 4.9 g/dL    Total Protein 6.8 6.0 - 8.2 g/dL    Globulin 3.4 1.9 - 3.5 g/dL    A-G Ratio 1.0 g/dL   Troponin STAT   Result Value Ref Range    Troponin T 37 (H) 6 - 19 ng/L   Lipase   Result Value Ref Range    Lipase 17 7 - 58 U/L   ESTIMATED GFR   Result Value Ref Range    GFR If African American >60 >60 mL/min/1.73 m 2    GFR If Non African American >60 >60 mL/min/1.73 m 2   COVID/SARS CoV-2 PCR    Specimen: Nasopharyngeal; Respirate   Result Value Ref Range    COVID Order Status Received    ETHYL ALCOHOL (BLOOD)   Result Value Ref Range    Diagnostic Alcohol <10.1 0.0 - 10.0 mg/dL   SARS-COV Antigen SALVATORE   Result Value Ref Range    SARS-CoV-2 Source Nasal Swab     SARS-COV ANTIGEN SALVATORE NotDetected Not-Detected   EKG   Result Value Ref Range    Report       [facility] Emergency Dept.    Test Date:  2020-10-23  Pt Name:     ANN GOODWIN                Department: [facility]  MRN:        [id number]                      [facility]  Gender:     Male                         Technician: [id number]  :        [id number]                   Requested By:LIZZIE LEOS  Order #:    [id number]                    Reading MD:    Measurements  Intervals                                Axis  Rate:       69                           P:          67  AK:         164                          QRS:        -40  QRSD:       82                           T:          45  QT:         408  QTc:        437    Interpretive Statements  SINUS RHYTHM  ATRIAL PREMATURE COMPLEX  LEFT AXIS DEVIATION  CONSIDER ANTEROSEPTAL INFARCT  Compared to ECG 2019 22:08:53  Atrial premature complex(es) now present  Left-axis deviation now present  Myocardial infarct finding now present  Sinus tachycardia no longer present  Left anterior fascicular block no long er present  Poor R-wave progression no longer present         RADIOLOGY  CT-CTA CHEST PULMONARY ARTERY W/ RECONS   Final Result         1.  Occlusion of the right middle lobe pulmonary artery, appears likely due to external compression by large right infrahilar pulmonary mass. Evaluation of the pulmonary arteries is limited due to motion artifacts, no large central pulmonary embolus is    appreciated. Evaluation of the distal segmental and subsegmental branches is largely nondiagnostic.   2.  Obstruction of the right lower lobe bronchus due to large right infrahilar pulmonary mass.   3.  Right infrahilar pulmonary mass, should be considered neoplastic unless proven otherwise. Findings correspond with chest x-ray.   4.  Atherosclerosis and atherosclerotic coronary artery disease.      DX-CHEST-PORTABLE (1 VIEW)   Final Result         1.  Right lung base round opacity, concerning for pulmonary mass, recommend further evaluation with CT chest for further characterization.   2.  Cardiomegaly      These findings were discussed  "with the patient's clinician, Cole Jewell, on 10/23/2020 2:00 AM.      NM-CARDIAC STRESS TEST    (Results Pending)        COURSE & MEDICAL DECISION MAKING  Pertinent Labs & Imaging studies reviewed by me. (See chart for details)  I verified that the patient was wearing a mask and I was wearing appropriate PPE every time I entered the room. The patient's mask was on the patient at all times during my encounter except for a brief view of the oropharynx.     70 y.o. male PMH \"bad cancer' p/w chest pain.     Differential diagnosis includes but is not limited to:  #chest pain  Heart score 4  Mild elevation in troponin  EKG with new changes, no STEMI  Aspirin given by EMS  Chest pain control at the moment  Would consider trending as inpatient  No elevation in ETOH at this time, checked given concern for alcohol intoxication given bizzare behavior in ED.  Behavior may be 2/2 schizophrenia    #mass  CT PE ordered given concern for lung mass and new shortness of breath to rule out pulmonary embolism also in the setting of patient's right lower extremity edema  No evidence of acute DVT at this time  No benefit in anticoagulation at this time however would consider oncology consult as inpatient or requiring records from outside hospital    Patient mated to Dr. Cano in guarded condition    FINAL IMPRESSION  Visit Diagnoses     ICD-10-CM   1. Acute chest pain  R07.9   2. Elevated troponin  R77.8   3. Lung mass  R91.8   4. Leg edema, right  R60.0              Electronically signed by: Cole Jewell M.D., 10/23/2020 6:19 AM          "

## 2020-10-23 NOTE — PROGRESS NOTES
Assumed pt care. AOx4. Denies any pain at this time.  Denies any other distress.  Discussed POC.  Pt refuses to change into gown to let this RN place cardiac monitor. This RN was able to place the monitor under shirts. Pt refuses vital signs to be taken, states he would like to sleep. Unable to complete admit profile at this time due to pts unwillingness to answer questions. Safety precautions in place. Call light within reach.

## 2020-10-23 NOTE — CARE PLAN
Problem: Communication  Goal: The ability to communicate needs accurately and effectively will improve  Outcome: PROGRESSING AS EXPECTED     Problem: Safety  Goal: Will remain free from injury  Outcome: PROGRESSING AS EXPECTED  Goal: Will remain free from falls  Outcome: PROGRESSING AS EXPECTED     Problem: Infection  Goal: Will remain free from infection  Outcome: PROGRESSING AS EXPECTED     Problem: Venous Thromboembolism (VTW)/Deep Vein Thrombosis (DVT) Prevention:  Goal: Patient will participate in Venous Thrombosis (VTE)/Deep Vein Thrombosis (DVT)Prevention Measures  Outcome: PROGRESSING AS EXPECTED     Problem: Bowel/Gastric:  Goal: Normal bowel function is maintained or improved  Outcome: PROGRESSING AS EXPECTED  Goal: Will not experience complications related to bowel motility  Outcome: PROGRESSING AS EXPECTED     Problem: Knowledge Deficit  Goal: Knowledge of disease process/condition, treatment plan, diagnostic tests, and medications will improve  Outcome: PROGRESSING AS EXPECTED  Goal: Knowledge of the prescribed therapeutic regimen will improve  Outcome: PROGRESSING AS EXPECTED     Problem: Discharge Barriers/Planning  Goal: Patient's continuum of care needs will be met  Outcome: PROGRESSING AS EXPECTED     Problem: Respiratory:  Goal: Respiratory status will improve  Outcome: PROGRESSING AS EXPECTED     Problem: Pain Management  Goal: Pain level will decrease to patient's comfort goal  Outcome: PROGRESSING AS EXPECTED     Problem: Psychosocial Needs:  Goal: Level of anxiety will decrease  Outcome: PROGRESSING AS EXPECTED

## 2020-10-23 NOTE — DISCHARGE SUMMARY
Discharge Summary    CHIEF COMPLAINT ON ADMISSION  Chief Complaint   Patient presents with   • Shortness of Breath     sob and chest pain for ten years. baseline per patient   • Chest Pain       Reason for Admission  Atypical chest pain     Admission Date  10/23/2020    CODE STATUS  Full Code    HPI & HOSPITAL COURSE  Mr. Nestor Strong is a 69 yo homeless man with schizophrenia, tobacco use, and EtOH use DO who presented to [facility] with atypical chest pain. EKG did not demonstrate acute ischemic changes (old LAFB) and troponins were not significantly elevated on admission nor 4 hours later (37, 40). He was scheduled for a stress test but declined because [facility]. CXR demonstrated a katiana-hilar / RML mass, which was confirmed as a 7.2 x 10.2 x 5.8 cm Right infrahilar mass concerning for maligancy, which was compressing the RML pulmonary artery. Pulmonary was consulted and arranged for bronchoscopy for transbronchial biopsy, but he declined due to [facility]. He was counseled on the need for biopsy for a definitive diagnosis to expedite referral for oncology for treatment, but he consistently expressed that he would follow up with [facility] the [facility]. He was deemed to have capacity and discharged AMA. He was hypertensive and reports not having or taking his HTN meds, which he could not name but gets filled at the [facility]. He did not have any tachycardia nor show signs of EtOH withdrawal. During discharge discussion, he was noted to have an insect crawling on him which was confirmed as pediculosis by microbiology. He was counseled to notify the [facility] and seek OTC or prescribed care through the [facility].       Therefore, he is discharged in guarded and stable condition against medcial advice.    The patient recovered much more quickly than anticipated on admission.    Discharge Date  10/23/2020    FOLLOW UP ITEMS POST DISCHARGE  1. Atypical chest pain - stress echo warranted  (LAFB) based on tobacco use and coronary artery calcifications noted on CT  2. Lung mass - most likely Small Cell Carcinoma based on katiana-hilar mass and tobacco use. Needs biopsy for definitive diagnosis prior to referral to oncology or hospice  3. Pediculosis - unlikely to eradicate successfully as he is homeless and stays in the shelters. Be aware of this during admission or ED presentation.  4. HTN - restart antihypertensives with [facility]CP [facility] the [facility]  5. Tobacco use - offered NRT, please continue to encourage cessation    DISCHARGE DIAGNOSES  Principal Problem:    Atypical chest pain POA: Yes  Active Problems:    Mass of right lung POA: Yes    Hypertension POA: Yes    Pediculosis POA: Yes    Tobacco abuse POA: Yes    Homeless POA: Yes  Resolved Problems:    * No resolved hospital problems. *      FOLLOW UP  No future appointments - Advised to [facility] to [facility] or call his PCP.  No follow-up provider specified.    MEDICATIONS ON DISCHARGE     Medication List      You have not been prescribed any medications.         Allergies  Allergies   Allergen Reactions   • Tape        DIET  Orders Placed This Encounter   Procedures   • Diet Order Regular     Standing Status:   Standing     Number of Occurrences:   1     Order Specific Question:   Diet:     Answer:   Regular [1]       ACTIVITY  As tolerated.  Weight bearing as tolerated    CONSULTATIONS  Pulmonology - Katiana-hilar mass    PROCEDURES  None    LABORATORY  Lab Results   Component Value Date    SODIUM 142 10/23/2020    POTASSIUM 3.9 10/23/2020    CHLORIDE 104 10/23/2020    CO2 27 10/23/2020    GLUCOSE 97 10/23/2020    BUN 15 10/23/2020    CREATININE 0.80 10/23/2020        Lab Results   Component Value Date    WBC 7.1 10/23/2020    HEMOGLOBIN 13.1 (L) 10/23/2020    HEMATOCRIT 41.9 (L) 10/23/2020    PLATELETCT 420 10/23/2020        Total time of the discharge process exceeds 35 minutes.

## 2020-10-23 NOTE — ASSESSMENT & PLAN NOTE
-Blood pressure 169/112 on admission, I suspect he is noncompliant with any medication.   
-Hold off on tobacco replacement at this time.  
-Observation status on telemetry  -Troponin on admission: 37 ng/dL  -EKG findings: Q wave changes on leads V2 and V3, no acute ischemia  -Serial cardiac enzymes every 4 hours ×3  -MONAB  -Other pertinent cardiac information: Ordered stress test in the morning.  
-Right lung base round opacity seen on chest x-ray, CT angiogram ordered and pending results.    
Certainly this is malignancy, given size and location particular concern for small cell carcinoma with endobronchial obstruction  Concern for possible liver metastasis as well on my read  No evidence of paraneoplastic disorder    Discussed with pt need for biopsy for tissue diagnosis prior to treatment. Best method would be bronchoscopy, which we could do this afternoon. He declined, stating he would like to be discharged AGAINST MEDICAL ADVICE and plans to go the [facility] where he would like to get the opinion of his PCP and pursue treatment there. He demonstrated medical decision-making capacity and expressed adequate understanding of the consequences of delayed treatment. Offered to facilitate an interfacility transfer and he declined.  
Normal rate, regular rhythm.  Heart sounds S1, S2.  No murmurs, rubs or gallops.

## 2020-10-23 NOTE — H&P
"Hospital Medicine History & Physical Note    Date of Service  10/23/2020    Primary Care Physician  Pcp Pt States None    Consultants  None    Code Status  Full Code    Chief Complaint  Chief Complaint   Patient presents with   • Shortness of Breath     sob and chest pain for ten years. baseline per patient   • Chest Pain       History of Presenting Illness  70 y.o. male, homeless patient with history of a schizophrenia, hypertension who was brought to the ER via EMS on 10/23/2020 with complaints of chest pain.  Patient is very poor historian, currently has his head cover and is not interested in having any conversations with me.  Patient denies having pain at this time.    ER COURSE:  -Patient afebrile upon arrival to the ED with heart rate of 81 bpm and elevated respiratory rate at 24 and blood pressure 169/112.  -Laboratory showing slightly elevated troponin 37 ng/L.  -Patient has EKG showing Q-wave inversions on anteroseptal leads that are new as compared to prior.  -Additionally, chest x-ray demonstrated \"Right lung base round opacity, concerning for pulmonary mass \".  -Chart review showed CT scan done on 3/30/2019 of the chest with a 3.5 cm mass versus pericardial cyst in the right lower chest adjacent to the right heart border in the infra Isiah area, with an additional area of consolidation medially to the right middle lobe anterior in the right hemithorax.  Patient is unable to clarify any type of follow-up.    Review of Systems  Review of Systems   Constitutional: Negative for fever.   HENT: Negative for congestion and sore throat.    Eyes: Negative for blurred vision and double vision.   Respiratory: Negative for cough and shortness of breath.    Cardiovascular: Positive for chest pain. Negative for palpitations.   Gastrointestinal: Negative for nausea and vomiting.   Genitourinary: Negative for dysuria and urgency.   Musculoskeletal: Negative for myalgias and neck pain.   Skin: Negative for itching and " rash.   Neurological: Negative for dizziness, weakness and headaches.   Endo/Heme/Allergies: Does not bruise/bleed easily.   Psychiatric/Behavioral: Negative for depression. The patient does not have insomnia.        Past Medical History   has a past medical history of Diabetes, Homeless, and Schizophrenia ([facility]).    Surgical History   has a past surgical history that includes hernia repair and mass excision general (10/1/2010).     Family History  Reviewed and not pertinent    Social History   reports that he has been smoking cigarettes. He does not have any smokeless tobacco history on file. He reports current alcohol use. He reports current drug use.    Allergies  Allergies   Allergen Reactions   • Tape        Medications  None       Physical Exam  Temp:  [36.9 °C (98.5 °F)] 36.9 °C (98.5 °F)  Pulse:  [81] 81  Resp:  [24] 24  BP: (169)/(112) 169/112  SpO2:  [92 %] 92 %    Physical Exam  Constitutional:       Appearance: Normal appearance.   HENT:      Head: Normocephalic and atraumatic.      Nose: Nose normal.      Mouth/Throat:      Mouth: Mucous membranes are moist.      Pharynx: Oropharynx is clear.   Eyes:      Extraocular Movements: Extraocular movements intact.      Pupils: Pupils are equal, round, and reactive to light.   Neck:      Musculoskeletal: Normal range of motion and neck supple.   Cardiovascular:      Rate and Rhythm: Normal rate and regular rhythm.      Pulses: Normal pulses.      Heart sounds: Normal heart sounds.   Pulmonary:      Effort: Pulmonary effort is normal.      Breath sounds: Normal breath sounds.   Abdominal:      General: Abdomen is flat. Bowel sounds are normal.      Palpations: Abdomen is soft.   Skin:     General: Skin is warm and dry.   Neurological:      General: No focal deficit present.      Mental Status: He is alert and oriented to person, place, and time.   Psychiatric:         Mood and Affect: Mood normal.         Behavior: Behavior normal.         Laboratory:  Recent  Labs     10/23/20  0103   WBC 7.1   RBC 5.17   HEMOGLOBIN 13.1*   HEMATOCRIT 41.9*   MCV 81.0*   MCH 25.3*   MCHC 31.3*   RDW 44.9   PLATELETCT 420   MPV 9.1     Recent Labs     10/23/20  0103   SODIUM 142   POTASSIUM 3.9   CHLORIDE 104   CO2 27   GLUCOSE 97   BUN 15   CREATININE 0.80   CALCIUM 8.7     Recent Labs     10/23/20  0103   ALTSGPT 11   ASTSGOT 16   ALKPHOSPHAT 90   TBILIRUBIN 0.6   LIPASE 17   GLUCOSE 97         No results for input(s): NTPROBNP in the last 72 hours.      Recent Labs     10/23/20  0103   TROPONINT 37*       Imaging:  DX-CHEST-PORTABLE (1 VIEW)   Final Result         1.  Right lung base round opacity, concerning for pulmonary mass, recommend further evaluation with CT chest for further characterization.   2.  Cardiomegaly      These findings were discussed with the patient's clinician, Cole Jewell, on 10/23/2020 2:00 AM.      CT-CTA CHEST PULMONARY ARTERY W/ RECONS    (Results Pending)   NM-CARDIAC STRESS TEST    (Results Pending)     EKG: Normal sinus rhythm at 69 bpm, left axis deviation with Q waves on V2 and V3.  No acute ST elevation.  This is my to petition.      Assessment/Plan:  I anticipate this patient is appropriate for observation status at this time.    * Atypical chest pain  Assessment & Plan  -Observation status on telemetry  -Troponin on admission: 37 ng/dL  -EKG findings: Q wave changes on leads V2 and V3, no acute ischemia  -Serial cardiac enzymes every 4 hours ×3  -MONAB  -Other pertinent cardiac information: Ordered stress test in the morning.    Hypertension- (present on admission)  Assessment & Plan  -Blood pressure 169/112 on admission, I suspect he is noncompliant with any medication.     Mass of right lung  Assessment & Plan  -Right lung base round opacity seen on chest x-ray, CT angiogram ordered and pending results.      Tobacco abuse- (present on admission)  Assessment & Plan  -Hold off on tobacco replacement at this time.    DVT Ppx: Lovenox

## 2020-10-24 LAB — EKG IMPRESSION: NORMAL

## 2020-11-09 ENCOUNTER — HOSPITAL ENCOUNTER (EMERGENCY)
Facility: MEDICAL CENTER | Age: 70
End: 2020-11-09
Attending: EMERGENCY MEDICINE
Payer: MEDICARE

## 2020-11-09 VITALS
DIASTOLIC BLOOD PRESSURE: 103 MMHG | OXYGEN SATURATION: 94 % | SYSTOLIC BLOOD PRESSURE: 149 MMHG | HEART RATE: 98 BPM | TEMPERATURE: 98.9 F | RESPIRATION RATE: 16 BRPM

## 2020-11-09 DIAGNOSIS — R06.09 CHRONIC DYSPNEA: ICD-10-CM

## 2020-11-09 DIAGNOSIS — F19.10 POLYSUBSTANCE ABUSE (HCC): ICD-10-CM

## 2020-11-09 LAB — POC BREATHALIZER: 0 PERCENT (ref 0–0.01)

## 2020-11-09 PROCEDURE — 302970 POC BREATHALIZER: Performed by: EMERGENCY MEDICINE

## 2020-11-09 PROCEDURE — 99284 EMERGENCY DEPT VISIT MOD MDM: CPT

## 2020-11-09 NOTE — ED NOTES
Pt continually asked to leave. Pt taking extensive amount of time to get dressed. Pt ambulating around room without assistance with steady gait. Pt given hot lunch and water. Pt d/c from ED a/o x 4 GCS 15 ambulatory without assistance with steady gait. No PIV placed in ED. Pt given d/c instructions and verbalized understanding.

## 2020-11-09 NOTE — ED PROVIDER NOTES
ED Provider Note    CHIEF COMPLAINT  Chief Complaint   Patient presents with   • Homeless     Left AMA from [facility], Hx cronic SOB per pt for years, Hx meth use, pt admitted to [facility]  Nestor Strong is a 70 y.o. male who presents to the emergency department by ambulance for shortness of breath.  Patient with history of shortness of breath.  Patient was at [facility], history polysubstance abuse, left AMA earlier this evening.  States he did some meth may have drank alcohol as well.  Now he like to go back to [facility].  He has no other place to go.    Denies fever.  Denies chest pain.  Denies syncope.  Denies suicidal homicidal ideation.    REVIEW OF SYSTEMS  See HPI for further details. All other systems are negative.     PAST MEDICAL HISTORY   has a past medical history of Diabetes, Homeless, and Schizophrenia ([facility]).    SOCIAL HISTORY  Social History     Tobacco Use   • Smoking status: Current Every Day Smoker     Types: Cigarettes   • Smokeless tobacco: Never Used   Substance and Sexual Activity   • Alcohol use: Yes     Comment: occ   • Drug use: Yes     Comment: meth occ   • Sexual activity: Not on file       SURGICAL HISTORY   has a past surgical history that includes hernia repair and mass excision general (10/1/2010).    CURRENT MEDICATIONS  Home Medications     Reviewed by Dorota Louis R.N. (Registered Nurse) on 11/09/20 at 0417  Med List Status: Not Addressed   Medication Last Dose Status        Patient Johnnie Taking any Medications                       ALLERGIES  Allergies   Allergen Reactions   • Tape        PHYSICAL EXAM  VITAL SIGNS: BP (!) 164/106   Pulse (!) 102   Temp 37.2 °C (98.9 °F) (Temporal)   Resp 16   SpO2 93%   Pulse ox interpretation: I interpret this pulse ox as normal.  Constitutional: Alert in no apparent distress.  Disheveled  HENT: Normocephalic, atraumatic. Bilateral external ears normal, Nose normal.  Dry mucous membranes.    Eyes: Pupils are equal and  reactive, Conjunctiva normal.   Neck: Normal range of motion, Supple  Lymphatic: No lymphadenopathy noted.   Cardiovascular: Regular rate and rhythm, no murmurs. Distal pulses intact.  No peripheral edema.  Thorax & Lungs: Slightly diminished bilateral bases otherwise normal breath sounds.  No wheezing/rales/ronchi. No increased work of breathing, clipped speech or retractions.   Abdomen: Soft, non-distended, non-tender to palpation.   Skin: Warm, Dry, No erythema, No rash.   Musculoskeletal: Good range of motion in all major joints.   Neurologic: Alert and oriented x4.  Was 4 extremity spontaneously.  Psychiatric: Flat, odd affect.  Cooperative.    COURSE & MEDICAL DECISION MAKING  Nursing notes and vital signs were reviewed. (See chart for details)  The patients records were reviewed, history was obtained from the patient;    Patient was seen evaluated bedside.  He has no complaints at this time.  He would like to return to [facility].  Admits to leaving AMA, he drank alcohol, although breathalyzer is 0.  Admits to chronic methamphetamine use as well.  Chronic shortness of breath although comfortable at this time.  Lung auscultation is quite unremarkable.  No clinical evidence for acute COPD exacerbation or fluid overload.  He is hemodynamically stable, hypertensive, history of the same and noncompliant with medications.  No hypoxia.  Speaking full sentences.  Tolerating food and fluids.  No clinical evidence for any acute medical condition.    5:55 AM Daisy, life skills, is aware of the patient.  She has contacted [facility], they will accept him back.  He will be transferred via taxi voucher [facility].    Patient is medically stable for discharge at this time, anticipatory guidance provided, close follow-up is encouraged, and strict ED return instructions have been detailed. Patient is agreeable to the disposition and plan.    Patient's blood pressure was elevated in the emergency department, and has been referred to  primary care for close monitoring.      FINAL IMPRESSION  (F19.10) Polysubstance abuse (HCC)  (R06.09) Chronic dyspnea      Electronically signed by: Araceli Montague D.O., 11/9/2020 4:54 AM      This dictation was created using voice recognition software. The accuracy of the dictation is limited to the abilities of the software. I expect there may be some errors of grammar and possibly content. The nursing notes were reviewed and certain aspects of this information were incorporated into this note.

## 2020-11-09 NOTE — DISCHARGE INSTRUCTIONS
Patient is medically stable for discharge return to [facility].      Follow-up with primary care this week for reevaluation, medication management close blood pressure monitoring.    Continue any home medications as previously indicated.    Stop using illicit drugs.  Do not drink alcohol to excess.  Encourage sobriety.    Return to the emergency department for altered mental status, suicidal or homicidal ideation, hallucination or for persistent or worsening shortness of breath, chest pain, syncope, fever or other new concerns.

## 2020-11-09 NOTE — DISCHARGE PLANNING
Alert Team:    Pt requesting to return to [facility] for further crisis stabilization. Patient has a hx of schizophrenia and is currently unsheltered. [facility] intake RN is familiar with this patient. Cab voucher provided #[id number].

## 2021-01-15 DIAGNOSIS — Z23 NEED FOR VACCINATION: ICD-10-CM
